# Patient Record
Sex: FEMALE | Race: OTHER | HISPANIC OR LATINO | URBAN - METROPOLITAN AREA
[De-identification: names, ages, dates, MRNs, and addresses within clinical notes are randomized per-mention and may not be internally consistent; named-entity substitution may affect disease eponyms.]

---

## 2019-04-25 ENCOUNTER — OUTPATIENT (OUTPATIENT)
Dept: OUTPATIENT SERVICES | Facility: HOSPITAL | Age: 74
LOS: 1 days | End: 2019-04-25
Payer: COMMERCIAL

## 2019-04-25 DIAGNOSIS — R06.09 OTHER FORMS OF DYSPNEA: ICD-10-CM

## 2019-04-25 PROCEDURE — 93018 CV STRESS TEST I&R ONLY: CPT

## 2019-04-25 PROCEDURE — A9500: CPT

## 2019-04-25 PROCEDURE — 78452 HT MUSCLE IMAGE SPECT MULT: CPT

## 2019-04-25 PROCEDURE — 93016 CV STRESS TEST SUPVJ ONLY: CPT

## 2019-04-25 PROCEDURE — 78452 HT MUSCLE IMAGE SPECT MULT: CPT | Mod: 26

## 2019-04-25 PROCEDURE — 93017 CV STRESS TEST TRACING ONLY: CPT

## 2019-06-03 ENCOUNTER — TRANSCRIPTION ENCOUNTER (OUTPATIENT)
Age: 74
End: 2019-06-03

## 2019-06-03 ENCOUNTER — OUTPATIENT (OUTPATIENT)
Dept: OUTPATIENT SERVICES | Facility: HOSPITAL | Age: 74
LOS: 1 days | End: 2019-06-03
Payer: COMMERCIAL

## 2019-06-03 VITALS
DIASTOLIC BLOOD PRESSURE: 83 MMHG | SYSTOLIC BLOOD PRESSURE: 138 MMHG | RESPIRATION RATE: 16 BRPM | HEART RATE: 67 BPM | OXYGEN SATURATION: 99 %

## 2019-06-03 VITALS
WEIGHT: 179.9 LBS | RESPIRATION RATE: 16 BRPM | OXYGEN SATURATION: 98 % | DIASTOLIC BLOOD PRESSURE: 87 MMHG | HEIGHT: 62 IN | HEART RATE: 71 BPM | SYSTOLIC BLOOD PRESSURE: 164 MMHG | TEMPERATURE: 98 F

## 2019-06-03 DIAGNOSIS — R93.1 ABNORMAL FINDINGS ON DIAGNOSTIC IMAGING OF HEART AND CORONARY CIRCULATION: ICD-10-CM

## 2019-06-03 DIAGNOSIS — Z98.890 OTHER SPECIFIED POSTPROCEDURAL STATES: ICD-10-CM

## 2019-06-03 LAB
ANION GAP SERPL CALC-SCNC: 12 MMOL/L — SIGNIFICANT CHANGE UP (ref 5–17)
APTT BLD: 22.3 SEC — LOW (ref 27.5–36.3)
BUN SERPL-MCNC: 16 MG/DL — SIGNIFICANT CHANGE UP (ref 8–20)
CALCIUM SERPL-MCNC: 9.9 MG/DL — SIGNIFICANT CHANGE UP (ref 8.6–10.2)
CHLORIDE SERPL-SCNC: 100 MMOL/L — SIGNIFICANT CHANGE UP (ref 98–107)
CO2 SERPL-SCNC: 23 MMOL/L — SIGNIFICANT CHANGE UP (ref 22–29)
CREAT SERPL-MCNC: 0.58 MG/DL — SIGNIFICANT CHANGE UP (ref 0.5–1.3)
GLUCOSE SERPL-MCNC: 101 MG/DL — SIGNIFICANT CHANGE UP (ref 70–115)
HCT VFR BLD CALC: 37.9 % — SIGNIFICANT CHANGE UP (ref 37–47)
HGB BLD-MCNC: 12.5 G/DL — SIGNIFICANT CHANGE UP (ref 12–16)
INR BLD: 0.9 RATIO — SIGNIFICANT CHANGE UP (ref 0.88–1.16)
MCHC RBC-ENTMCNC: 29.1 PG — SIGNIFICANT CHANGE UP (ref 27–31)
MCHC RBC-ENTMCNC: 33 G/DL — SIGNIFICANT CHANGE UP (ref 32–36)
MCV RBC AUTO: 88.1 FL — SIGNIFICANT CHANGE UP (ref 81–99)
PLATELET # BLD AUTO: 198 K/UL — SIGNIFICANT CHANGE UP (ref 150–400)
POTASSIUM SERPL-MCNC: 4.6 MMOL/L — SIGNIFICANT CHANGE UP (ref 3.5–5.3)
POTASSIUM SERPL-SCNC: 4.6 MMOL/L — SIGNIFICANT CHANGE UP (ref 3.5–5.3)
PROTHROM AB SERPL-ACNC: 10.3 SEC — SIGNIFICANT CHANGE UP (ref 10–12.9)
RBC # BLD: 4.3 M/UL — LOW (ref 4.4–5.2)
RBC # FLD: 14.3 % — SIGNIFICANT CHANGE UP (ref 11–15.6)
SODIUM SERPL-SCNC: 135 MMOL/L — SIGNIFICANT CHANGE UP (ref 135–145)
WBC # BLD: 6.5 K/UL — SIGNIFICANT CHANGE UP (ref 4.8–10.8)
WBC # FLD AUTO: 6.5 K/UL — SIGNIFICANT CHANGE UP (ref 4.8–10.8)

## 2019-06-03 PROCEDURE — 80048 BASIC METABOLIC PNL TOTAL CA: CPT

## 2019-06-03 PROCEDURE — 93458 L HRT ARTERY/VENTRICLE ANGIO: CPT

## 2019-06-03 PROCEDURE — T1013: CPT

## 2019-06-03 PROCEDURE — 85027 COMPLETE CBC AUTOMATED: CPT

## 2019-06-03 PROCEDURE — 93005 ELECTROCARDIOGRAM TRACING: CPT

## 2019-06-03 PROCEDURE — 36415 COLL VENOUS BLD VENIPUNCTURE: CPT

## 2019-06-03 PROCEDURE — C1894: CPT

## 2019-06-03 PROCEDURE — 99152 MOD SED SAME PHYS/QHP 5/>YRS: CPT

## 2019-06-03 PROCEDURE — 85730 THROMBOPLASTIN TIME PARTIAL: CPT

## 2019-06-03 PROCEDURE — C1769: CPT

## 2019-06-03 PROCEDURE — C1887: CPT

## 2019-06-03 PROCEDURE — 85610 PROTHROMBIN TIME: CPT

## 2019-06-03 PROCEDURE — 93458 L HRT ARTERY/VENTRICLE ANGIO: CPT | Mod: 26

## 2019-06-03 PROCEDURE — 93010 ELECTROCARDIOGRAM REPORT: CPT

## 2019-06-03 RX ORDER — ASPIRIN/CALCIUM CARB/MAGNESIUM 324 MG
81 TABLET ORAL DAILY
Refills: 0 | Status: DISCONTINUED | OUTPATIENT
Start: 2019-06-03 | End: 2019-06-18

## 2019-06-03 RX ADMIN — Medication 81 MILLIGRAM(S): at 13:59

## 2019-06-03 NOTE — DISCHARGE NOTE NURSING/CASE MANAGEMENT/SOCIAL WORK - NSDCDPATPORTLINK_GEN_ALL_CORE
You can access the [facility] Patient Portal, offered by [facility], by registering with the following website: [email or web address]

## 2019-06-03 NOTE — H&P PST ADULT - HISTORY OF PRESENT ILLNESS
72 yo female with pmhx DM, HTN who presents for [facility]. She has c/o DEAN especially when climbing stairs. Currently denies chest pain, sob, palps.     Echo: 4/16/19: EF 55-60%. Evidence of LV dysfunction. Cannot exclude regional wall motion abnormalities due to limited endocardial visualization. Mild MR. Trace TR. 72 yo female with pmhx DM, HTN who presents for C. She has c/o DEAN especially when climbing stairs. Currently denies chest pain, sob, palps. She was found to have an abnormal stress test and referred for LHC.     Echo: 4/16/19: EF 55-60%. Evidence of LV dysfunction. Cannot exclude regional wall motion abnormalities due to limited endocardial visualization. Mild MR. Trace TR.     NST: 4/2019: There is a medium sized mild to moderate defect in anterior and anteroseptal walls that are partially reveresible, suggestive of mild ischemia. Perfusion defects are in part the result of breast attentuation. 72 yo female with pmhx DM, HTN who presents for C. She has c/o DEAN especially when climbing stairs. Currently denies chest pain, sob, palps. She was found to have an abnormal stress test and referred for LHC.     Echo: 4/16/19: EF 55-60%. Evidence of LV dysfunction. Cannot exclude regional wall motion abnormalities due to limited endocardial visualization. Mild MR. Trace TR.     NST: 4/2019: There is a medium sized mild to moderate defect in anterior and anteroseptal walls that are partially reveresible, suggestive of mild ischemia. Perfusion defects are in part the result of breast attentuation.     BRA: 1.7%

## 2019-06-03 NOTE — DISCHARGE NOTE PROVIDER - INSTRUCTIONS
Choose lean meats and poultry without skin and prepare them without added saturated and trans fat.  Eat fish at least twice a week. Recent research shows that eating oily fish containing omega-3 fatty acids (for example, salmon, trout and herring) may help lower your risk of death from coronary artery disease.  Select fat-free, 1 percent fat and low-fat dairy products.  Cut back on foods containing partially hydrogenated vegetable oils to reduce trans fat in your diet.   To lower cholesterol, reduce saturated fat to no more than 5 to 6 percent of total calories. For someone eating 2,000 calories a day, that’s about 13 grams of saturated fat.  Cut back on beverages and foods with added sugars.  Choose and prepare foods with little or no salt. To lower blood pressure, aim to eat no more than 2,400 milligrams of sodium per day. Reducing daily intake to 1,500 mg is desirable because it can lower blood pressure even further.

## 2019-06-03 NOTE — PROGRESS NOTE ADULT - PROBLEM SELECTOR PLAN 1
s/p LHC w/ clean coronary arteries  continue current medication regimen  band off at 15:30  resume diet  ambulate as tolerated  Follow up outpatient with Don HOOKER  disposition home

## 2019-06-03 NOTE — PROGRESS NOTE ADULT - ASSESSMENT
74 yo female with pmhx DM, HTN who presents for [facility]. She has c/o DEAN especially when climbing stairs. Currently denies chest pain, sob, palps. She was found to have an abnormal stress test and referred for C. Echo: 4/16/19: EF 55-60%. Evidence of LV dysfunction. Cannot exclude regional wall motion abnormalities due to limited endocardial visualization. Mild MR. Trace TR.  NST: 4/2019: There is a medium sized mild to moderate defect in anterior and anteroseptal walls that are partially reveresible, suggestive of mild ischemia. Perfusion defects are in part the result of breast attenuation Patient is now s/p LHC with clean coronary arteries.

## 2019-06-03 NOTE — ASU PATIENT PROFILE, ADULT - TEACHING/LEARNING LEARNING PREFERENCES
written material/computer/internet/pictorial/skill demonstration/verbal instruction/audio/group instruction/individual instruction/video

## 2019-06-03 NOTE — PROGRESS NOTE ADULT - SUBJECTIVE AND OBJECTIVE BOX
CC:  Patient is a 73y old  Female who presents with a chief complaint of DEAN (03 Jun 2019 13:15)    HPI:  72 yo female with pmhx DM, HTN who presents for C. She has c/o DEAN especially when climbing stairs. Currently denies chest pain, sob, palps. She was found to have an abnormal stress test and referred for LHC.   Echo: 4/16/19: EF 55-60%. Evidence of LV dysfunction. Cannot exclude regional wall motion abnormalities due to limited endocardial visualization. Mild MR. Trace TR.   NST: 4/2019: There is a medium sized mild to moderate defect in anterior and anteroseptal walls that are partially reveresible, suggestive of mild ischemia. Perfusion defects are in part the result of breast attentuation.   BRA: 1.7% (03 Jun 2019 13:15)    ROS: All review of systems negative unless indicated otherwise below.     Lab Results:  CBC  CBC Full  -  ( 03 Jun 2019 13:29 )  WBC Count : 6.5 K/uL  RBC Count : 4.30 M/uL  Hemoglobin : 12.5 g/dL  Hematocrit : 37.9 %  Platelet Count - Automated : 198 K/uL  Mean Cell Volume : 88.1 fl  Mean Cell Hemoglobin : 29.1 pg  Mean Cell Hemoglobin Concentration : 33.0 g/dL  Auto Neutrophil # : x  Auto Lymphocyte # : x  Auto Monocyte # : x  Auto Eosinophil # : x  Auto Basophil # : x  Auto Neutrophil % : x  Auto Lymphocyte % : x  Auto Monocyte % : x  Auto Eosinophil % : x  Auto Basophil % : x    .		Differential:	[] Automated		[] Manual  Chemistry                        12.5   6.5   )-----------( 198      ( 03 Jun 2019 13:29 )             37.9     06-03    135  |  100  |  16.0  ----------------------------<  101  4.6   |  23.0  |  0.58    Ca    9.9      03 Jun 2019 13:29    PT/INR - ( 03 Jun 2019 13:29 )   PT: 10.3 sec;   INR: 0.90 ratio    PTT - ( 03 Jun 2019 13:29 )  PTT:22.3 sec    MEDICATIONS  (STANDING):  aspirin enteric coated 81 milliGRAM(s) Oral daily    PHYSICAL EXAM:  Vital Signs Last 24 Hrs  T(C): 36.7 (03 Jun 2019 13:40), Max: 36.7 (03 Jun 2019 13:40)  T(F): 98.1 (03 Jun 2019 13:40), Max: 98.1 (03 Jun 2019 13:40)  HR: 72 (03 Jun 2019 15:45) (71 - 74)  BP: 156/80 (03 Jun 2019 15:45) (156/80 - 176/82)  BP(mean): --  RR: 16 (03 Jun 2019 15:45) (16 - 16)  SpO2: 96% (03 Jun 2019 15:45) (96% - 98%)    GENERAL: NAD, well-groomed, well-developed  HEAD:  Atraumatic, Normocephalic  NECK: Supple, No JVD  NERVOUS SYSTEM:  Alert & Oriented X3, Good concentration; Motor Strength 5/5 B/L upper and lower extremities, sensation intact  CHEST/LUNG: Clear to auscultation bilaterally, No rales, rhonchi, wheezing, or rubs  HEART: Regular rate and rhythm; s1 and s2 auscultated, No murmurs, rubs, or gallops  ABDOMEN: Soft, Nontender, Nondistended; Bowel sounds present and normoactive.   EXTREMITIES:  2+ Peripheral Pulses, No clubbing, cyanosis, or edema  SKIN: No rashes or lesions  CATH SITE: Right wrist benign s/p cath.  No bleeding, no ecchymosis, no hematoma. Extremity Warm to touch, with palpable distal pulses, and brisk capillary refill.

## 2019-06-03 NOTE — H&P PST ADULT - NSICDXPASTMEDICALHX_GEN_ALL_CORE_FT
PAST MEDICAL HISTORY:  DM (diabetes mellitus)     HTN (hypertension) PAST MEDICAL HISTORY:  DM (diabetes mellitus)     HTN (hypertension)     Hypothyroid

## 2019-06-03 NOTE — DISCHARGE NOTE PROVIDER - HOSPITAL COURSE
72 yo female with pmhx DM, HTN who presents for [facility]. She has c/o DEAN especially when climbing stairs. Currently denies chest pain, sob, palps. She was found to have an abnormal stress test and referred for [facility]. Echo: 4/16/19: EF 55-60%. Evidence of LV dysfunction. Cannot exclude regional wall motion abnormalities due to limited endocardial visualization. Mild MR. Trace TR.  NST: 4/2019: There is a medium sized mild to moderate defect in anterior and anteroseptal walls that are partially reveresible, suggestive of mild ischemia. Perfusion defects are in part the result of breast attenuation Patient is now s/p C with clean coronary arteries.         Pt with stable vital signs, telemetry stable, physical exam unremarkable and unchanged from pre-procedural baseline, neurovascularly intact, ambulating, eating, review of systems completely negative. pt verbalized an understanding of the discharge teaching. Patient to follow up with Dr. Ochoa within 2 weeks

## 2019-06-03 NOTE — H&P PST ADULT - PULMONARY EMBOLUS
Pt reports to ED via traige. A/Ox3 and ambulatory from waiting room. States she woke up at 11AM this morning with severe pain in right shoulder.  Denies PMH no

## 2019-06-03 NOTE — DISCHARGE NOTE PROVIDER - NSDCCPCAREPLAN_GEN_ALL_CORE_FT
PRINCIPAL DISCHARGE DIAGNOSIS  Diagnosis: S/P coronary angiogram  Assessment and Plan of Treatment: S/p Left heart cath - clearn coronary arteries.

## 2019-06-03 NOTE — DISCHARGE NOTE PROVIDER - CARE PROVIDER_API CALL
Russ Ochoa)  Cardiovascular Disease; Interventional Cardiology  [address] [facility], [address] [place] [address]  Phone: [phone]  Fax: [phone]  Follow Up Time: 2 weeks

## 2020-10-28 PROBLEM — E03.9 HYPOTHYROIDISM, UNSPECIFIED: Chronic | Status: ACTIVE | Noted: 2019-06-03

## 2020-11-05 ENCOUNTER — APPOINTMENT (OUTPATIENT)
Dept: FAMILY MEDICINE | Facility: CLINIC | Age: 75
End: 2020-11-05
Payer: MEDICARE

## 2020-11-05 VITALS
TEMPERATURE: 98.1 F | HEIGHT: 60 IN | OXYGEN SATURATION: 98 % | DIASTOLIC BLOOD PRESSURE: 70 MMHG | SYSTOLIC BLOOD PRESSURE: 105 MMHG | HEART RATE: 82 BPM | WEIGHT: 180 LBS | RESPIRATION RATE: 12 BRPM | BODY MASS INDEX: 35.34 KG/M2

## 2020-11-05 DIAGNOSIS — I10 ESSENTIAL (PRIMARY) HYPERTENSION: ICD-10-CM

## 2020-11-05 DIAGNOSIS — Z23 ENCOUNTER FOR IMMUNIZATION: ICD-10-CM

## 2020-11-05 DIAGNOSIS — Z87.898 PERSONAL HISTORY OF OTHER SPECIFIED CONDITIONS: ICD-10-CM

## 2020-11-05 DIAGNOSIS — M89.8X9 OTHER SPECIFIED DISORDERS OF BONE, UNSPECIFIED SITE: ICD-10-CM

## 2020-11-05 DIAGNOSIS — Z78.9 OTHER SPECIFIED HEALTH STATUS: ICD-10-CM

## 2020-11-05 DIAGNOSIS — E03.9 HYPOTHYROIDISM, UNSPECIFIED: ICD-10-CM

## 2020-11-05 DIAGNOSIS — E78.5 HYPERLIPIDEMIA, UNSPECIFIED: ICD-10-CM

## 2020-11-05 DIAGNOSIS — Z00.00 ENCOUNTER FOR GENERAL ADULT MEDICAL EXAMINATION W/OUT ABNORMAL FINDINGS: ICD-10-CM

## 2020-11-05 DIAGNOSIS — E11.9 TYPE 2 DIABETES MELLITUS W/OUT COMPLICATIONS: ICD-10-CM

## 2020-11-05 PROCEDURE — G0439: CPT

## 2020-11-05 PROCEDURE — 99072 ADDL SUPL MATRL&STAF TM PHE: CPT

## 2020-11-05 PROCEDURE — 90670 PCV13 VACCINE IM: CPT

## 2020-11-05 PROCEDURE — 36415 COLL VENOUS BLD VENIPUNCTURE: CPT

## 2020-11-05 PROCEDURE — G0009: CPT

## 2020-11-05 RX ORDER — MULTIVIT-MIN/IRON/FOLIC ACID/K 18-600-40
CAPSULE ORAL
Refills: 0 | Status: ACTIVE | COMMUNITY

## 2020-11-05 RX ORDER — LORATADINE 10 MG
17 TABLET,DISINTEGRATING ORAL
Refills: 0 | Status: ACTIVE | COMMUNITY

## 2020-11-05 RX ORDER — ISOPROPYL ALCOHOL 700 MG/ML
LIQUID TOPICAL
Refills: 0 | Status: ACTIVE | COMMUNITY

## 2020-11-05 RX ORDER — ASPIRIN 81 MG/1
81 TABLET, DELAYED RELEASE ORAL
Refills: 0 | Status: ACTIVE | COMMUNITY

## 2020-11-05 RX ORDER — BLOOD SUGAR DIAGNOSTIC
STRIP MISCELLANEOUS
Qty: 1 | Refills: 2 | Status: ACTIVE | COMMUNITY
Start: 2020-11-05 | End: 1900-01-01

## 2020-11-05 RX ORDER — ALCOHOL ANTISEPTIC PADS
PADS, MEDICATED (EA) TOPICAL
Refills: 0 | Status: ACTIVE | COMMUNITY
Start: 2020-11-05

## 2020-11-05 RX ORDER — COLD-HOT PACK
EACH MISCELLANEOUS
Refills: 0 | Status: ACTIVE | COMMUNITY

## 2020-11-05 NOTE — PHYSICAL EXAM
[No Acute Distress] : no acute distress [Well Nourished] : well nourished [Well Developed] : well developed [Normal Sclera/Conjunctiva] : normal sclera/conjunctiva [PERRL] : pupils equal round and reactive to light [No Respiratory Distress] : no respiratory distress  [No Accessory Muscle Use] : no accessory muscle use [Clear to Auscultation] : lungs were clear to auscultation bilaterally [Declined Breast Exam] : declined breast exam  [Normal Posterior Cervical Nodes] : no posterior cervical lymphadenopathy [Normal Anterior Cervical Nodes] : no anterior cervical lymphadenopathy [Normal] : no joint swelling and grossly normal strength and tone [No Rash] : no rash [Coordination Grossly Intact] : coordination grossly intact [Normal Gait] : normal gait [Normal Affect] : the affect was normal [Normal Insight/Judgement] : insight and judgment were intact [Comprehensive Foot Exam Normal] : Right and left foot were examined and both feet are normal. No ulcers in either foot. Toes are normal and with full ROM.  Normal tactile sensation with monofilament testing throughout both feet [de-identified] : hard bony mass noted left side clavicular notch- patient reports having this for many years, never had it checked; will check XR. [FreeTextEntry1] : ifobt

## 2020-11-05 NOTE — HEALTH RISK ASSESSMENT
[Good] : ~his/her~  mood as  good [No] : In the past 12 months have you used drugs other than those required for medical reasons? No [No falls in past year] : Patient reported no falls in the past year [0] : 2) Feeling down, depressed, or hopeless: Not at all (0) [No Retinopathy] : No retinopathy [Patient reported PAP Smear was normal] : Patient reported PAP Smear was normal [Patient declined bone density test] : Patient declined bone density test [Patient reported colonoscopy was normal] : Patient reported colonoscopy was normal [HIV test declined] : HIV test declined [Hepatitis C test declined] : Hepatitis C test declined [None] : None [Cultural] : cultural [Alone] : lives alone [Retired] : retired [Single] : single [Feels Safe at Home] : Feels safe at home [Fully functional (bathing, dressing, toileting, transferring, walking, feeding)] : Fully functional (bathing, dressing, toileting, transferring, walking, feeding) [Fully functional (using the telephone, shopping, preparing meals, housekeeping, doing laundry, using] : Fully functional and needs no help or supervision to perform IADLs (using the telephone, shopping, preparing meals, housekeeping, doing laundry, using transportation, managing medications and managing finances) [Smoke Detector] : smoke detector [Carbon Monoxide Detector] : carbon monoxide detector [Seat Belt] :  uses seat belt [Sunscreen] : uses sunscreen [Patient/Caregiver not ready to engage] : Patient/Caregiver not ready to engage [] : No [de-identified] : minimal  [de-identified] : well balanced [[id number]] : 0 [EyeExamDate] : 2019 [Change in mental status noted] : No change in mental status noted [Language] : denies difficulty with language [Sexually Active] : not sexually active [Reports changes in hearing] : Reports no changes in hearing [Reports changes in vision] : Reports no changes in vision [Reports normal functional visual acuity (ie: able to read med bottle)] : Reports poor functional visual acuity.  [Reports changes in dental health] : Reports no changes in dental health [Guns at Home] : no guns at home [Safety elements used in home] : no safety elements used in home [Travel to Developing Areas] : does not  travel to developing areas [TB Exposure] : is not being exposed to tuberculosis [Caregiver Concerns] : does not have caregiver concerns [MammogramDate] : ordered [PapSmearDate] : 2018 [BoneDensityDate] : ordered [ColonoscopyDate] : 2016 [de-identified] : lenses

## 2020-11-05 NOTE — PLAN
[FreeTextEntry1] : no need to check labs today; patient to f/u in 4- 6 weeks for thyroid values check, sooner if needed.\par f/u with cardiology, neurology, endocrinology as discussed.\par f/u with podiatry and ophthalmology as discussed.\par complete mammo and DEXA scan as ordered- will f/u. \par complete XR left clavicle notch- f/u result. \par patient to have all reports sent to our office for review and record keeping.\par medications renewed as requested.\par pneumococcal vaccine administered today, counseled on vaccine, tolerated well. \par notify office if worsening/persistent symptoms or new onset complaints/concerns, in the event of any emergency or life threatening condition, go to the nearest emergency department and then notify office. \par patient verbalized understanding and agrees with plan of care.

## 2020-11-05 NOTE — COUNSELING
[Behavioral health counseling provided] : Behavioral health counseling provided [Sleep ___ hours/day] : Sleep [unfilled] hours/day [Engage in a relaxing activity] : Engage in a relaxing activity [Plan in advance] : Plan in advance [None] : None [Good understanding] : Patient has a good understanding of lifestyle changes and steps needed to achieve self management goal [de-identified] : Maintain balanced diet of whole grain, fruits and vegetables, lean meats, skinless poultry, fish, beans, soy products, eggs, nuts, and low fat dairy as per FDA dietary guidelines. \par Avoid high calorie/low nutrient foods. \par vegetables/fruits- at least 5 servings/day, \par whole grains- 100% whole grain and at least 3 grams fiber/day.\par reduce/eliminate saturated fat- less than 5% on nutrition labels (ex. adams, deli meat, hot dogs, fried foods, cookies, ice cream, chips, soft drinks, etc.)\par stay within your FDA recommended daily calorie needs or use the plate method to portion intake. \par Avoid fast food and limit eating out. When eating out choose healthy alternatives (ex. grilled, baked, steamed. low fat dressings. avoid french fries).\par DO NOT CONSUME FOODS YOU ARE ALLERGIC TO DESPITE DIETARY RECOMMENDATIONS.\par \par For more information you can visit www.Health.gov \par \par Incorporate regular physical activity most days of the week. \par Regular aerobic activity- moderate intensity, most days/wk, at least 30min/day- ex. brisk walk 2.5miles/hr, ballroom dancing, water aerobics, light yard work (raking/lawn mowing) actively playing basketball, biking 10miles/hr.\par Muscle strengthening and strength/resistance exercises 2-3days/wk- ex. free weights, resistance bands, your own weight (squats/pull-ups/push-ups).\par Neuro-motor exercises for balance/agility/coordination and flexibility exercises 2-3days/wk, 20-30min/day- ex. yoga and kelli-chi.\par Bone strengthening at least 30min/day, most days of the week- ex. weights, resistance bands, running, brisk walking, jumping rope, stair climbing, tennis.\par Decrease sedentary time. \par LISTEN TO YOUR BODY AND MODIFY ACTIVITY AS NEEDED- DO NOT OVEREXERT YOURSELF DURING PHYSICAL ACTIVITY DESPITE RECOMMENDATION.\par \par For more information you can visit www.Health.gov \par \par Foot care: check feet daily and wash with mild soap and lukewarm water. \par Use lotions (as directed by podiatrist).\par File/clip toe nails after washing (as directed by podiatrist).\par Do not tear calluses. \par Use clean socks with well-fitted shoes.\par Do not go bare foot and do not cross legs. \par you should follow-up with a podiatrist yearly for maintenance of your feet.\par \par Eye care- you should follow-up with an ophthalmologist/optometrist yearly to screen for retinal damage and other complications that may occur secondary to diabetes. \par Bring the paperwork provided to you today to your visit to have the doctor complete and have the paperwork returned to our office.\par \par For more information you can visit www.medlineplus.gov  \par

## 2020-11-05 NOTE — HISTORY OF PRESENT ILLNESS
[[employer] ] : provided by [employer]   [FreeTextEntry1] : [id number] [FreeTextEntry2] : Nedra [TWNoteComboBox1] : Canadian [de-identified] : This is a 76y/o Nicaraguan speaking female here today to establish care with new PCP, previous provider was not able to have  and patient is switching office because of this.\par PMH includes HTN, HLD, CAD with h/o stent placement; patient is poor historian, reports as well managed on Losartan, Atorvastatin, and low dose daily asa as directed, NP obtaining reports from cardiologist for review of cardiac history. spoke with patient's cardiologist Dr. Bond as per office personnel, patient has not been seen since Feb. 2020 and requires f/u. agrees to contact patient to schedule f/u visit and agrees to fax patient's reports. \par PMH also includes hypothyroid; previously on levothyroxine 115mcg daily, NP reviewed patients most recent labs from Oct. 2020- TSH elevated. patient denies having followed up with PCP since labs were checked- will adjust medication as appropriate, patient agrees to RTO for recheck of thyroid in 4- 6 weeks. \par DM2; patient reports as well managed on Janumet, Januvia, and Glipizide taken as prescribed, hgba1c reviewed from lab work 10/2020- value within goal range, will continue medications and referred to endocrinology as patient is on 3 agents. \par patient endorses h/o memory loss, denies having ever followed up with neurology- will provide referral for further evaluation.\par PSH; as discussed above.\par family history; poor historian. \par o/w no other major concerns; denies other major accidents, injuries, illnesses, hospitalizations.

## 2020-11-05 NOTE — REVIEW OF SYSTEMS
[Memory Loss] : memory loss [Negative] : Heme/Lymph [Headache] : no headache [Dizziness] : no dizziness [Fainting] : no fainting [Confusion] : no confusion [Unsteady Walking] : no ataxia [FreeTextEntry7] : constipation at times- reports as well managed with OTC MiraLAX as directed. [de-identified] : as per patient. referral to neurology for further evaluation.

## 2020-11-16 ENCOUNTER — NON-APPOINTMENT (OUTPATIENT)
Age: 75
End: 2020-11-16

## 2020-11-16 LAB — HEMOCCULT STL QL IA: NEGATIVE

## 2020-11-17 RX ORDER — GLIPIZIDE 10 MG/1
10 TABLET, EXTENDED RELEASE ORAL DAILY
Qty: 30 | Refills: 0 | Status: ACTIVE | COMMUNITY
Start: 2020-11-05 | End: 1900-01-01

## 2020-11-17 RX ORDER — LOSARTAN POTASSIUM 50 MG/1
50 TABLET, FILM COATED ORAL DAILY
Qty: 90 | Refills: 0 | Status: ACTIVE | COMMUNITY
Start: 2020-11-05 | End: 1900-01-01

## 2020-11-17 RX ORDER — SITAGLIPTIN AND METFORMIN HYDROCHLORIDE 50; 1000 MG/1; MG/1
50-1000 TABLET, FILM COATED ORAL TWICE DAILY
Qty: 60 | Refills: 0 | Status: ACTIVE | COMMUNITY
Start: 2020-11-05 | End: 1900-01-01

## 2020-12-17 ENCOUNTER — RX RENEWAL (OUTPATIENT)
Age: 75
End: 2020-12-17

## 2021-01-03 ENCOUNTER — RX RENEWAL (OUTPATIENT)
Age: 76
End: 2021-01-03

## 2021-01-06 ENCOUNTER — RX RENEWAL (OUTPATIENT)
Age: 76
End: 2021-01-06

## 2021-01-14 ENCOUNTER — RX RENEWAL (OUTPATIENT)
Age: 76
End: 2021-01-14

## 2021-01-14 RX ORDER — ATORVASTATIN CALCIUM 20 MG/1
20 TABLET, FILM COATED ORAL
Qty: 30 | Refills: 0 | Status: ACTIVE | COMMUNITY
Start: 2020-11-05 | End: 1900-01-01

## 2021-02-09 ENCOUNTER — APPOINTMENT (OUTPATIENT)
Dept: CT IMAGING | Facility: CLINIC | Age: 76
End: 2021-02-09

## 2021-02-09 ENCOUNTER — RX RENEWAL (OUTPATIENT)
Age: 76
End: 2021-02-09

## 2021-03-31 ENCOUNTER — APPOINTMENT (OUTPATIENT)
Dept: ENDOCRINOLOGY | Facility: CLINIC | Age: 76
End: 2021-03-31

## 2023-03-07 ENCOUNTER — APPOINTMENT (OUTPATIENT)
Dept: OTOLARYNGOLOGY | Facility: CLINIC | Age: 78
End: 2023-03-07

## 2023-03-24 ENCOUNTER — APPOINTMENT (OUTPATIENT)
Dept: OTOLARYNGOLOGY | Facility: CLINIC | Age: 78
End: 2023-03-24
Payer: MEDICARE

## 2023-04-14 ENCOUNTER — APPOINTMENT (OUTPATIENT)
Dept: OTOLARYNGOLOGY | Facility: CLINIC | Age: 78
End: 2023-04-14
Payer: MEDICARE

## 2023-04-14 PROCEDURE — 99204 OFFICE O/P NEW MOD 45 MIN: CPT

## 2023-04-14 RX ORDER — LEVOTHYROXINE SODIUM 0.12 MG/1
125 TABLET ORAL
Qty: 30 | Refills: 0 | Status: COMPLETED | COMMUNITY
Start: 2020-11-05 | End: 2023-04-14

## 2023-04-14 RX ORDER — LEVOTHYROXINE SODIUM 0.11 MG/1
112 TABLET ORAL
Refills: 0 | Status: ACTIVE | COMMUNITY

## 2023-04-14 NOTE — HISTORY OF PRESENT ILLNESS
[de-identified] : 77 yro pt referred by Dee Rocha NP for eval of thyroid.  labs 2/10/2023: TSH 0.67 wnl and Free T4 1.31 wnl.  US done in Dr. Rocha's office. Pt denies dysphonia, dysphagia, pain, SOB, otalgia.\par Family hx of thyroid cancer - niece.   Pt denies hx of radiation to the neck.  Pt takes levothyroxine 112mcg.

## 2023-04-14 NOTE — REASON FOR VISIT
[[employer] ] : provided by [employer]   [FreeTextEntry2] : thyroid  [Interpreters_IDNumber] : [id number] [Interpreters_FullName] : Gabino [TWNoteComboBox1] : Uzbek

## 2023-04-21 ENCOUNTER — OFFICE (OUTPATIENT)
Dept: URBAN - METROPOLITAN AREA CLINIC 115 | Facility: CLINIC | Age: 78
Setting detail: OPHTHALMOLOGY
End: 2023-04-21
Payer: MEDICAID

## 2023-04-21 DIAGNOSIS — B30.9: ICD-10-CM

## 2023-04-21 PROCEDURE — 92012 INTRM OPH EXAM EST PATIENT: CPT | Performed by: OPTOMETRIST

## 2023-04-21 ASSESSMENT — PACHYMETRY
OS_CT_UM: 593
OD_CT_UM: 605
OD_CT_CORRECTION: -4
OS_CT_CORRECTION: -4

## 2023-04-21 ASSESSMENT — REFRACTION_MANIFEST
OD_SPHERE: PLANO
OD_ADD: +2.50
OS_CYLINDER: -1.50
OD_SPHERE: PLANO
OD_VA1: 20/25+2
OS_ADD: +2.50
OS_CYLINDER: -1.50
OD_CYLINDER: -2.00
OS_VA1: 20/25-1
OD_CYLINDER: -2.00
OS_AXIS: 065
OD_AXIS: 065
OS_ADD: +2.50
OD_VA1: 20/25+2
OS_AXIS: 065
OS_VA1: 20/25-1
OU_VA: 20/25-2
OS_SPHERE: +0.25
OD_ADD: +2.50
OU_VA: 20/25-2
OS_SPHERE: +0.25
OD_AXIS: 065

## 2023-04-21 ASSESSMENT — KERATOMETRY
OS_K1POWER_DIOPTERS: 41.25
OD_K1POWER_DIOPTERS: 36.00
METHOD_AUTO_MANUAL: AUTO
OD_K2POWER_DIOPTERS: 43.00
OS_AXISANGLE_DEGREES: 170
OD_AXISANGLE_DEGREES: 180
OS_K2POWER_DIOPTERS: 43.25

## 2023-04-21 ASSESSMENT — SPHEQUIV_DERIVED
OS_SPHEQUIV: -0.5
OS_SPHEQUIV: -0.5

## 2023-04-21 ASSESSMENT — SUPERFICIAL PUNCTATE KERATITIS (SPK)
OS_SPK: 1+
OD_SPK: 1+

## 2023-04-21 ASSESSMENT — AXIALLENGTH_DERIVED
OS_AL: 24.2645
OS_AL: 24.2645

## 2023-04-21 ASSESSMENT — REFRACTION_CURRENTRX
OS_OVR_VA: 20/
OD_OVR_VA: 20/

## 2023-04-21 ASSESSMENT — VISUAL ACUITY
OD_BCVA: 20/60
OS_BCVA: 20/50

## 2023-05-05 ENCOUNTER — APPOINTMENT (OUTPATIENT)
Dept: ULTRASOUND IMAGING | Facility: CLINIC | Age: 78
End: 2023-05-05
Payer: MEDICARE

## 2023-05-05 ENCOUNTER — OUTPATIENT (OUTPATIENT)
Dept: OUTPATIENT SERVICES | Facility: HOSPITAL | Age: 78
LOS: 1 days | End: 2023-05-05

## 2023-05-05 DIAGNOSIS — E04.1 NONTOXIC SINGLE THYROID NODULE: ICD-10-CM

## 2023-05-05 PROCEDURE — 76536 US EXAM OF HEAD AND NECK: CPT | Mod: 26

## 2023-05-12 ENCOUNTER — APPOINTMENT (OUTPATIENT)
Dept: OTOLARYNGOLOGY | Facility: CLINIC | Age: 78
End: 2023-05-12
Payer: MEDICARE

## 2023-05-12 VITALS
DIASTOLIC BLOOD PRESSURE: 70 MMHG | BODY MASS INDEX: 38.83 KG/M2 | HEART RATE: 82 BPM | HEIGHT: 57 IN | SYSTOLIC BLOOD PRESSURE: 117 MMHG | WEIGHT: 180 LBS

## 2023-05-12 DIAGNOSIS — K21.9 GASTRO-ESOPHAGEAL REFLUX DISEASE W/OUT ESOPHAGITIS: ICD-10-CM

## 2023-05-12 PROCEDURE — 31575 DIAGNOSTIC LARYNGOSCOPY: CPT

## 2023-05-12 PROCEDURE — 99213 OFFICE O/P EST LOW 20 MIN: CPT | Mod: 25

## 2023-05-12 NOTE — HISTORY OF PRESENT ILLNESS
[de-identified] : 77 year old female presents for follow up for thyroid nodules. Last blood labs 2/10/2023: TSH 0.67 wnl and Free T4 1.31 wnl. States has a lot of phlegm in throat that is stuck inside her throat at times able to cough white to yellow phlegm and has a nagging hard cough causes her to choke,and dysphagia at times when eating. Denies odynophagia, dyspnea, dysphonia, night sweats, chills, fevers, or otalgia. \par \par US Thyroid 5/5/23\par IMPRESSION:\par Diffusely heterogeneous thyroid gland consistent with thyroiditis.\par Subcentimeter left thyroid nodules. There are no suspicious lesions.\par TI-RAD 2: Not suspicious (No FNA)\par \par Complete review of systems which was performed during a previous encounter was reviewed with the patient and there are no changes except as stated in the HPI section.\par

## 2023-05-12 NOTE — REASON FOR VISIT
[Subsequent Evaluation] : a subsequent evaluation for [Other: ______] : provided by LEONARDA [FreeTextEntry2] : thyroid nodules  [Interpreters_IDNumber] : [id number] [Interpreters_FullName] : Willian [TWNoteComboBox1] : Nicaraguan

## 2023-05-30 ENCOUNTER — APPOINTMENT (OUTPATIENT)
Dept: OTOLARYNGOLOGY | Facility: CLINIC | Age: 78
End: 2023-05-30
Payer: MEDICARE

## 2023-05-30 DIAGNOSIS — E04.1 NONTOXIC SINGLE THYROID NODULE: ICD-10-CM

## 2023-05-30 DIAGNOSIS — H90.3 SENSORINEURAL HEARING LOSS, BILATERAL: ICD-10-CM

## 2023-05-30 PROCEDURE — 99213 OFFICE O/P EST LOW 20 MIN: CPT | Mod: 25

## 2023-05-30 PROCEDURE — 92557 COMPREHENSIVE HEARING TEST: CPT

## 2023-05-30 PROCEDURE — 92567 TYMPANOMETRY: CPT

## 2023-05-30 RX ORDER — SITAGLIPTIN 100 MG/1
100 TABLET, FILM COATED ORAL DAILY
Qty: 30 | Refills: 0 | Status: COMPLETED | COMMUNITY
Start: 2020-11-05 | End: 2023-05-30

## 2023-05-30 RX ORDER — CEFPODOXIME PROXETIL 200 MG/1
200 TABLET, FILM COATED ORAL
Qty: 14 | Refills: 0 | Status: DISCONTINUED | COMMUNITY
Start: 2023-05-18

## 2023-05-30 NOTE — PHYSICAL EXAM
[Normal] : mucosa is normal [Midline] : trachea located in midline position [FreeTextEntry1] : overweight, NAD  [de-identified] : no LAD, +thyroid nodule

## 2023-05-30 NOTE — REASON FOR VISIT
[Other: ______] : provided by LEONARDA [FreeTextEntry2] : hearing loss [Interpreters_IDNumber] : [id number] [Interpreters_FullName] : Heather

## 2023-05-30 NOTE — CONSULT LETTER
[Dear  ___] : Dear ~DARREN, [Courtesy Letter:] : I had the pleasure of seeing your patient, [unfilled], in my office today. [Please see my note below.] : Please see my note below. [Sincerely,] : Sincerely, [FreeTextEntry2] : Dee Velásquez NP \par [address]\[place] [address] [FreeTextEntry3] : Yazmin Casas PA-C \par Department of Otolaryngology\par [facility]\par Otolaryngology at [place]\par [address] [facility],\[place] [address]\par \par \par Otf Velez MD, FACS \par Chief of Otolaryngology at [facility] \par  \par Dept. of Otolaryngology \par [facility]

## 2023-05-30 NOTE — HISTORY OF PRESENT ILLNESS
[de-identified] : 77F presents as a referral from Dr. Fitzpatrick for hearing loss. Patient's daughter feels that she has not been able to hear as well lately. She reports a gradual onset, says right ear is better than the left. Denies history of otalgia, otorrhea, ear infections, tinnitus, dizziness, vertigo, ear surgeries, head/otologic trauma, no chemo therapy. Denies loud noise exposure.\par \par Patient also reports throat discomfort, says she has a lot of phlegm. Reports heartburn, cough. Patient denies dysphagia, odynophagia, dyspnea, dysphonia, globus. \par

## 2023-05-31 ENCOUNTER — OFFICE (OUTPATIENT)
Dept: URBAN - METROPOLITAN AREA CLINIC 115 | Facility: CLINIC | Age: 78
Setting detail: OPHTHALMOLOGY
End: 2023-05-31
Payer: MEDICAID

## 2023-05-31 DIAGNOSIS — H35.40: ICD-10-CM

## 2023-05-31 DIAGNOSIS — H25.13: ICD-10-CM

## 2023-05-31 DIAGNOSIS — B30.9: ICD-10-CM

## 2023-05-31 DIAGNOSIS — H16.223: ICD-10-CM

## 2023-05-31 DIAGNOSIS — E11.9: ICD-10-CM

## 2023-05-31 DIAGNOSIS — H35.033: ICD-10-CM

## 2023-05-31 PROCEDURE — 92250 FUNDUS PHOTOGRAPHY W/I&R: CPT | Performed by: OPTOMETRIST

## 2023-05-31 PROCEDURE — 92012 INTRM OPH EXAM EST PATIENT: CPT | Performed by: OPTOMETRIST

## 2023-05-31 ASSESSMENT — PACHYMETRY
OD_CT_CORRECTION: -4
OS_CT_UM: 593
OD_CT_UM: 605
OS_CT_CORRECTION: -4

## 2023-05-31 ASSESSMENT — SUPERFICIAL PUNCTATE KERATITIS (SPK)
OS_SPK: 1+
OD_SPK: 1+

## 2023-05-31 ASSESSMENT — REFRACTION_AUTOREFRACTION
OD_SPHERE: -0.75
OD_CYLINDER: -1.50
OS_AXIS: 064
OS_SPHERE: +0.25
OS_CYLINDER: -1.75
OD_AXIS: 073

## 2023-05-31 ASSESSMENT — REFRACTION_MANIFEST
OU_VA: 20/25-2
OS_SPHERE: +0.25
OS_ADD: +2.50
OS_ADD: +2.50
OD_VA1: 20/25+2
OD_VA1: 20/25+2
OD_SPHERE: PLANO
OD_CYLINDER: -2.00
OS_CYLINDER: -1.50
OS_CYLINDER: -1.50
OD_AXIS: 065
OD_SPHERE: PLANO
OD_ADD: +2.50
OS_AXIS: 065
OD_CYLINDER: -2.00
OS_SPHERE: +0.25
OS_VA1: 20/25-1
OS_VA1: 20/25-1
OS_AXIS: 065
OD_ADD: +2.50
OD_AXIS: 065
OU_VA: 20/25-2

## 2023-05-31 ASSESSMENT — VISUAL ACUITY
OS_BCVA: 20/50
OD_BCVA: 20/70

## 2023-05-31 ASSESSMENT — SPHEQUIV_DERIVED
OS_SPHEQUIV: -0.5
OS_SPHEQUIV: -0.5
OD_SPHEQUIV: -1.5
OS_SPHEQUIV: -0.625

## 2023-05-31 ASSESSMENT — REFRACTION_CURRENTRX
OS_OVR_VA: 20/
OD_OVR_VA: 20/

## 2023-05-31 ASSESSMENT — CONFRONTATIONAL VISUAL FIELD TEST (CVF)
OS_FINDINGS: FULL
OD_FINDINGS: FULL

## 2023-05-31 ASSESSMENT — TONOMETRY: OS_IOP_MMHG: 21

## 2023-11-01 ENCOUNTER — APPOINTMENT (OUTPATIENT)
Dept: PULMONOLOGY | Facility: CLINIC | Age: 78
End: 2023-11-01

## 2023-11-13 ENCOUNTER — APPOINTMENT (OUTPATIENT)
Dept: VASCULAR SURGERY | Facility: CLINIC | Age: 78
End: 2023-11-13
Payer: MEDICARE

## 2023-11-13 VITALS
OXYGEN SATURATION: 95 % | HEIGHT: 60 IN | SYSTOLIC BLOOD PRESSURE: 115 MMHG | HEART RATE: 80 BPM | WEIGHT: 178 LBS | DIASTOLIC BLOOD PRESSURE: 68 MMHG | RESPIRATION RATE: 14 BRPM | BODY MASS INDEX: 34.95 KG/M2 | TEMPERATURE: 98.2 F

## 2023-11-13 DIAGNOSIS — R60.0 LOCALIZED EDEMA: ICD-10-CM

## 2023-11-13 DIAGNOSIS — Z86.79 PERSONAL HISTORY OF OTHER DISEASES OF THE CIRCULATORY SYSTEM: ICD-10-CM

## 2023-11-13 DIAGNOSIS — E11.65 TYPE 2 DIABETES MELLITUS WITH HYPERGLYCEMIA: ICD-10-CM

## 2023-11-13 DIAGNOSIS — Z86.39 PERSONAL HISTORY OF OTHER ENDOCRINE, NUTRITIONAL AND METABOLIC DISEASE: ICD-10-CM

## 2023-11-13 DIAGNOSIS — E11.21 TYPE 2 DIABETES MELLITUS WITH DIABETIC NEPHROPATHY: ICD-10-CM

## 2023-11-13 PROCEDURE — 99203 OFFICE O/P NEW LOW 30 MIN: CPT

## 2023-11-13 RX ORDER — GABAPENTIN 100 MG/1
100 CAPSULE ORAL 3 TIMES DAILY
Qty: 90 | Refills: 0 | Status: ACTIVE | COMMUNITY
Start: 2023-11-13 | End: 1900-01-01

## 2023-11-14 RX ORDER — CHLORHEXIDINE GLUCONATE 213 G/1000ML
1 SOLUTION TOPICAL ONCE
Refills: 0 | Status: DISCONTINUED | OUTPATIENT
Start: 2023-11-15 | End: 2023-11-30

## 2023-11-14 NOTE — H&P PST ADULT - HISTORY OF PRESENT ILLNESS
HPI:     77 y/o female with PMHx of HTN, HLD, poorly controlled DM, and coronary artery calcifications, was seen at OP cardiology office for follow up, Patient with c/o mild exertional dyspnea, and left leg pain.  Patient underwent coronary CTA , revealing possible high grade stenosis of LAD with a significant calcium burden,  patient had cardiac PET  scan in 5/23 which was negative for ischemia. Patient underwent lower extremity PVR, which was inconclusive due to npn compressibility, further vascular evaluation pending.  Patient currently denies Dizziness, CP, Palpitations**********  Patient now presents to [facility] CCL for LHC to assess coronary artery with possible intervention.    Symptoms:        Angina (Class):        Ischemic Symptoms: SOB on exertion    Heart Failure: No       Systolic/Diastolic/Combined:        NYHA Class (within 2 weeks):     Assessment of LVEF (Must be within 6 months):       EF: 58%       Assessed by: TTE       Date: 02/2023    Prior Cardiac Interventions:       PCI's (Date, Stents, Vessels): None       CABG (Date, Grafts): no    Noninvasive Testing:   REST/ Stress/PET/CT Test: Date: 05/08/23       Protocol: regadenososon         EKG Changes: negative       Myocardial Imaging: negative stress EKG, coronary calcification is noted involving LAD, RCA        No evidence for significant ischemia or scar is noted       During stress: Global systolic function is normal, EF IS 60%,     TTE, Coronary CTA report not in chart/system, requested from DR Velazquez's office*********    Echo (Date, Findings): 02/23   EF: 58%,mild to moderate MR, mild MS, Mild to moderate TR    Coronary CTA with total calcium score of 1516 with possible significant stenosis noted in the LAD     02/23: Carotid doppler: Mild plaque    05/08/23 PET/CT myocardial perfusion scan      Antianginal Therapies:        Beta Blockers:  none       Calcium Channel Blockers: none       Long Acting Nitrates: none       Ranexa: none      Associated Risk Factors:        Cerebrovascular Disease: N/A       Chronic Lung Disease: N/A       Peripheral Arterial Disease: N/A       Chronic Kidney Disease (if yes, what is GFR): N/A       Uncontrolled Diabetes (if yes, what is HgbA1C or FBS): yes A1c not available        Poorly Controlled Hypertension (if yes, what is SBP): N/A       Morbid Obesity (if yes, what is BMI): N/A       History of Recent Ventricular Arrhythmia: N/A       Inability to Ambulate Safely: N/A       Need for Therapeutic Anticoagulation: N/A       Antiplatelet or Contrast Allergy: N/A     HPI:     77 y/o female with PMHx of HTN, HLD, poorly controlled DM, and coronary artery calcifications, was seen at OP cardiology office for follow up, Patient with c/o mild exertional dyspnea, and left leg pain.  Patient underwent coronary CTA , revealing possible high grade stenosis of LAD with a significant calcium burden,  patient had cardiac PET  scan in 5/23 which was negative for ischemia. Patient underwent lower extremity PVR, which was inconclusive due to npn compressibility, further vascular evaluation pending.  Patient currently denies Dizziness, CP, Palpitations**********  Patient now presents to [facility] CCL for LHC to assess coronary artery with possible intervention.    Symptoms:        Angina (Class):        Ischemic Symptoms: SOB on exertion    Heart Failure: No       Systolic/Diastolic/Combined:        NYHA Class (within 2 weeks):     Assessment of LVEF (Must be within 6 months):       EF: 58%       Assessed by: TTE       Date: 02/2023    Prior Cardiac Interventions:       PCI's (Date, Stents, Vessels): None       CABG (Date, Grafts): no    Noninvasive Testing:   REST/ Stress/PET/CT Test: Date: 05/08/23       Protocol: regadenososon         EKG Changes: negative       Myocardial Imaging: negative stress EKG, coronary calcification is noted involving LAD, RCA        No evidence for significant ischemia or scar is noted       During stress: Global systolic function is normal, EF IS 60%,     TTE, Coronary CTA report not in chart/system, requested from DR Velazquez's office*********    Echo (Date, Findings): 02/23   EF: 58%,mild to moderate MR, mild MS, Mild to moderate TR    Coronary CTA with total calcium score of 1516 with possible significant stenosis noted in the LAD     02/23: Carotid doppler: Mild plaque    05/08/23 PET/CT myocardial perfusion scan      Antianginal Therapies:        Beta Blockers:  none       Calcium Channel Blockers: none       Long Acting Nitrates: none       Ranexa: none      Associated Risk Factors:        Cerebrovascular Disease: N/A       Chronic Lung Disease: N/A       Peripheral Arterial Disease: N/A       Chronic Kidney Disease (if yes, what is GFR): N/A       Uncontrolled Diabetes (if yes, what is HgbA1C or FBS): yes A1c not available        Poorly Controlled Hypertension (if yes, what is SBP): N/A       Morbid Obesity (if yes, what is BMI): N/A       History of Recent Ventricular Arrhythmia: N/A       Inability to Ambulate Safely: N/A       Need for Therapeutic Anticoagulation: N/A       Antiplatelet or Contrast Allergy: N/A    Vital Signs Last 24 Hrs  T(C): --  T(F): --  HR: --  BP: --  BP(mean): --  RR: --  SpO2: --         77 y/o female with PMHx of HTN, HLD, poorly controlled DM, and coronary artery calcifications, was seen at OP cardiology office for follow up, Patient with c/o mild exertional dyspnea, and left leg pain.  Patient underwent coronary CTA , revealing possible high grade stenosis of LAD with a significant calcium burden,  patient had cardiac PET  scan in 5/23 which was negative for ischemia. Patient underwent lower extremity PVR, which was inconclusive due to npn compressibility, further vascular evaluation pending.      Symptoms:        Angina (Class):        Ischemic Symptoms:     Heart Failure: N/A    Assessment of LVEF:       EF: 60%       Assessed by: Coronary CTA       Date: 11/3/2023    Prior Cardiac Interventions:       PCI's: N/A       CABG: N/A    LHC: 6/3/2019  CORONARY VESSELS: The coronary circulation is right dominant.  LM:     --  LM: Normal.  LAD:     --  Proximal LAD: There was a 10 % stenosis.  --  D1: There was a 40 % stenosis.  CX:     --  Distal circumflex: There was a 20 % stenosis.  RCA:     --  RCA: Angiography showed mild atherosclerosis with no flow limiting lesions.    Noninvasive Testing:   Cardiac CTA Calcium Score: 11/3/2023       Coronary Circulation: Right dominant        LM: There is a mixture of calcified and noncalcified plaque in the LM with no discrete luminal stenosis > 20%. (49)       LAD: There is a mixture of calcified and noncalcified plaque in the pLAD, mLAD, and D1. There is a suspected luminal stenosis > 70% within 1 cm of the mLAD, approximately 2 cm from it's origin. There is a suspected luminal stenosis > 70% in the proximal 1 cm of the D1. (518)       LCX: There is a mixture of calcified and noncalcified plaque in the pLCX and mLCX with no discrete luminal stenosis > 50%. (33)       RCA: There is a mixture of calcified and noncalcified plaque in the pRCA, mRCA, dRCA, and PLV branch with no discrete luminal stenosis > 50%. (916)       Total: 1516    Stress Test: 5/8/2023       Protocol: Cardiac PET Scan       Symptoms: None       EKG Changes: Negative       Myocardial Imaging: Now evidence of infarct or ischemia. Coronary calcification in the LAD and RCA region.       Risk Assessment: Low    Echo: 4/16/2019  1. Left Ventricle: Normal LV size and systolic function. Estimated LVEF 55-60%. Evidence of diastolic LV dysfunction.  Cannot exclude regional wall motion abnormalities due to limited endocardial visualization.   2. Right Ventricle: Normal RV size and systolic function.   3. Atria: Moderate LA enlargement.   4. Aortic Valve: Sclerotic aortic valves with adequate opening.   5. Mitral Valve: Mild mitral stenosis. Mild mitral regurgitation.   6. Tricuspid Valve: Trace tricuspid regurgitation.   7. Pulmonic Valve: Grossly normal valvular function.   8. Estimated PA systolic pressure 30-35mmHg, normal.   9. Technically limited study.     Antianginal Therapies:        Beta Blockers: N/A       Calcium Channel Blockers: N/A       Long Acting Nitrates: N/A       Ranexa: N/A    Associated Risk Factors:        Frailty: N/A       Cerebrovascular Disease: N/A       Chronic Lung Disease: N/A       Peripheral Arterial Disease: N/A       Chronic Kidney Disease (if yes, what is GFR): N/A       Uncontrolled Diabetes (if yes, what is HgbA1C or FBS): N/A       Poorly Controlled Hypertension (if yes, what is SBP): N/A       Morbid Obesity (if yes, what is BMI): N/A       History of Recent Ventricular Arrhythmia: N/A       Inability to Ambulate Safely: N/A       Need for Therapeutic Anticoagulation: N/A       Antiplatelet or Contrast Allergy: N/A    Social History:        Marital:        Tobacco:        ETOH:        Caffeine:     ROS:   General: No fevers/chills. No fatigue  HEENT: No hearing loss. No visual disturbances. No headaches. No epistaxis.  Pulmonary: No dyspnea. No wheeze. No cough.  CV: No chest pain. No DEAN. No palpitations. No orthopnea. No PND. No edema.  GI: No BRBPR. No melena. No nausea.  : No hematuria.  Neuro: No weakness. No paresthesia. No syncope.    T(C): --  HR: --  BP: --  RR: --  SpO2: --  Physical Exam:   General: Awake, alert, speech clear, no acute distress.  Neck: No bruit, no JVD.  Chest: S1, S2. No murmur. CTA.  Abdomen: Soft. Nondistended.  Extremities: No edema. Pulses: DP: Right: 2+, Left: 2+, Radial: Right: 2+, Left: 2+ 79 y/o female never smoker with history of HTN, HLD, DM2, hypothyroid, and coronary artery calcifications seen on prior cardiac PET scan. She was seen Dr. Velazquez c/o mild exertional dyspnea, fatigue, and left leg pain. Patient underwent coronary CTA, revealing possible high grade stenosis of LAD with a significant calcium burden, patient had cardiac PET scan in 5/23 which was negative for ischemia. Patient underwent lower extremity PVR, which was inconclusive due to non compressibility, further vascular evaluation pending.    Symptoms:        Angina (Class): N/A       Ischemic Symptoms: DEAN (CCS class 3 anginal equivalent)    Heart Failure: N/A    Assessment of LVEF:       EF: 60%       Assessed by: Coronary CTA       Date: 11/3/2023    Prior Cardiac Interventions:       PCI's: N/A       CABG: N/A    LHC: 6/3/2019  CORONARY VESSELS: The coronary circulation is right dominant.  LM:     --  LM: Normal.  LAD:     --  Proximal LAD: There was a 10 % stenosis.  --  D1: There was a 40 % stenosis.  CX:     --  Distal circumflex: There was a 20 % stenosis.  RCA:     --  RCA: Angiography showed mild atherosclerosis with no flow limiting lesions.    Noninvasive Testing:   Cardiac CTA Calcium Score: 11/3/2023       Coronary Circulation: Right dominant        LM: There is a mixture of calcified and noncalcified plaque in the LM with no discrete luminal stenosis > 20%. (49)       LAD: There is a mixture of calcified and noncalcified plaque in the pLAD, mLAD, and D1. There is a suspected luminal stenosis > 70% within 1 cm of the mLAD, approximately 2 cm from it's origin. There is a suspected luminal stenosis > 70% in the proximal 1 cm of the D1. (518)       LCX: There is a mixture of calcified and noncalcified plaque in the pLCX and mLCX with no discrete luminal stenosis > 50%. (33)       RCA: There is a mixture of calcified and noncalcified plaque in the pRCA, mRCA, dRCA, and PLV branch with no discrete luminal stenosis > 50%. (916)       Total: 1516    Stress Test: 5/8/2023       Protocol: Cardiac PET Scan       Symptoms: None       EKG Changes: Negative       Myocardial Imaging: Now evidence of infarct or ischemia. Coronary calcification in the LAD and RCA region.       Risk Assessment: Low    Echo: 4/16/2019  1. Left Ventricle: Normal LV size and systolic function. Estimated LVEF 55-60%. Evidence of diastolic LV dysfunction.  Cannot exclude regional wall motion abnormalities due to limited endocardial visualization.   2. Right Ventricle: Normal RV size and systolic function.   3. Atria: Moderate LA enlargement.   4. Aortic Valve: Sclerotic aortic valves with adequate opening.   5. Mitral Valve: Mild mitral stenosis. Mild mitral regurgitation.   6. Tricuspid Valve: Trace tricuspid regurgitation.   7. Pulmonic Valve: Grossly normal valvular function.   8. Estimated PA systolic pressure 30-35mmHg, normal.   9. Technically limited study.     Antianginal Therapies:        Beta Blockers: N/A       Calcium Channel Blockers: N/A       Long Acting Nitrates: N/A       Ranexa: N/A    Associated Risk Factors:        Frailty: N/A       Cerebrovascular Disease: N/A       Chronic Lung Disease: N/A       Peripheral Arterial Disease: N/A       Chronic Kidney Disease (if yes, what is GFR): N/A       Uncontrolled Diabetes (if yes, what is HgbA1C or FBS): N/A       Poorly Controlled Hypertension (if yes, what is SBP): N/A       Morbid Obesity (if yes, what is BMI): N/A       History of Recent Ventricular Arrhythmia: N/A       Inability to Ambulate Safely: N/A       Need for Therapeutic Anticoagulation: N/A       Antiplatelet or Contrast Allergy: N/A    Social History:        Marital: , lives alone, daughter nearby.       Tobacco: Never smoker       ETOH: Denies       Caffeine: 1 cup coffee daily    ROS:   General: No fevers/chills. + fatigue  HEENT: No hearing loss. No visual disturbances. No headaches. No epistaxis.  Pulmonary: No dyspnea. No wheeze. + cough.  CV: No chest pain. + DEAN. No palpitations. No orthopnea. No PND. + edema.  GI: No BRBPR. No melena. No nausea.  : No hematuria.  Neuro: No weakness. No paresthesia. No syncope.    T(C): 36.8 (11-15-23 @ 14:00), Max: 36.8 (11-15-23 @ 14:00)  HR: 75 (11-15-23 @ 14:00)  BP: 152/74 (11-15-23 @ 14:00)  RR: 18 (11-15-23 @ 14:00)  SpO2: 93% (11-15-23 @ 14:00)  Physical Exam:   General: Awake, alert, speech clear, no acute distress.  Neck: No bruit, no JVD.  Chest: S1, S2. No murmur. CTA.  Abdomen: Soft. Nondistended.  Extremities: No edema. Pulses: DP: Right: 1+, Left: + by doppler, Radial: Right: 2+, Left: 2+

## 2023-11-14 NOTE — H&P PST ADULT - OTHER CARE PROVIDERS
DR CHLOÉ CLAY (Cardiology) Tavares Velazquez ([facility] - [facility], [address] [place] [id number], [phone], Fax: [phone]))

## 2023-11-14 NOTE — H&P PST ADULT - ASSESSMENT
77 y/o female with PMHx of HTN, HLD, poorly controlled DM, and coronary artery calcifications, was seen at OP cardiology office for follow up, Patient with c/o mild exertional dyspnea, and left leg pain.  Patient underwent coronary CTA , revealing possible high grade stenosis of LAD with a significant calcium burden,  patient had cardiac PET  scan in 5/23 which was negative for ischemia.   Patient now presents to [facility] CCL for LHC to assess coronary artery with possible intervention.    Risk Assessments:  ASA:  Mallampati:  GFR:   Cr:  BRA:    Impression: Strong CVd risk factors of     Plan:    -plan for *** via ***  -patient seen and examined  -confirmed appropriate NPO duration  -ECG and Labs reviewed  -Aspirin 81mg po pre-cath  -procedure discussed with patient; risks and benefits explained, questions answered  -consent obtained by attending IC           79 y/o female with PMHx of HTN, HLD, poorly controlled DM, and coronary artery calcifications, was seen at OP cardiology office for follow up, Patient with c/o mild exertional dyspnea, and left leg pain for which vascular evaluation in progress,.   Patient underwent coronary CTA , revealing possible high grade stenosis of LAD with a significant calcium burden,  patient had cardiac PET  scan in 5/23 which was negative for ischemia.   Patient now presents to [facility] CCL for LHC to assess coronary artery with possible intervention.    Risk Assessments:  ASA: 3  Mallampati:  GFR:   Cr:  BRA:    Impression: Strong CVd risk factors of     Plan:    -plan for LHC with possible intervention  -Preferred ACCESS: RRA/RFA  -patient seen and examined  -confirmed appropriate NPO duration  -ECG and Labs reviewed  -Aspirin 81mg po pre-cath  -procedure discussed with patient; risks and benefits explained, questions answered  -consent to be obtained by attending DR Gil           77 y/o female with PMHx of HTN, HLD, poorly controlled DM, and coronary artery calcifications, was seen at OP cardiology office for follow up, Patient with c/o mild exertional dyspnea, and left leg pain for which vascular evaluation in progress,.   Patient underwent coronary CTA , revealing possible high grade stenosis of LAD with a significant calcium burden,  patient had cardiac PET  scan in 5/23 which was negative for ischemia.   Patient now presents to [facility] CCL for LHC to assess coronary artery with possible intervention.    Risk Assessments:  ASA: 3  Mallampati:  GFR:   Cr:  BRA:    Impression: Strong CVd risk factors of HTN, HLD, Elevated coronary calcium score  Patient requires LHC to evalaute coronary anatomy    Plan:    -plan for LHC with possible intervention  -Preferred ACCESS: RRA/RFA  -patient seen and examined  -confirmed appropriate NPO duration  -ECG and Labs reviewed  -Aspirin 81mg po pre-cath  -procedure discussed with patient; risks and benefits explained, questions answered  -consent to be obtained by attending DR Gil           Assessment:     ASA:   Mall:   ABR:   GFR:   Creatinine:   10 Year ASCVD Risk:     Problem List:   1.   ·	LHC and possible intervention. Consent obtained.  ·	Procedure explained and questions answered.   ·	Will start DAPT if PCI performed.  ·	IV:  mL IV over 1 hour pre and post procedure  ·	Aspirin if not taken today.    2.     Discharge Planning:   Discharge today if no PCI performed.  Discharge in AM if PCI performed. Assessment: 77 y/o female never smoker with history of HTN, HLD, DM2, hypothyroid, and coronary artery calcifications seen on prior cardiac PET scan. She was seen Dr. Velazquez c/o mild exertional dyspnea, fatigue, and left leg pain. Patient underwent coronary CTA, revealing possible high grade stenosis of LAD with a significant calcium burden, patient had cardiac PET scan in 5/23 which was negative for ischemia. Patient underwent lower extremity PVR, which was inconclusive due to non compressibility, further vascular evaluation pending.    ASA: 3  Mall: 2  ABR: 2%  GFR: 87  Creatinine: 0.72    Problem List:   1. Stable angina with moderate risk CTA  ·	LHC and possible intervention. Consent obtained.  ·	Procedure explained and questions answered.   ·	Will start DAPT if PCI performed.  ·	IV:  mL IV over 1 hour pre and post procedure  ·	Aspirin if not taken today.    2. HLD  Goal Non-HDL < 100, LDL < 70  Lipid Panel: ***  Statin:   Other: N/A    3. HTN  ·	BP: 153/74  ·	Beta Blocker: N/A  ·	RAASi: Will continue losartan 25 mg daily  ·	CCB: N/A  ·	Other: N/A    4. DM2  ·	GDMT:   ·	     Diabetic diet.  ·	     FS Q AC and HS with coverage  ·	     HgbA1C: ***  ·	     Basal Insulin: N/A  ·	     Nutritional Insulin: N/A  ·	     Oral Antihyperglycemics: Will continue glipizide 10 mg BID upon discharge. Will restart Janumet  mg BID on November 18, 2023.  ·	     SGLT2i/GLP1-RA: N/A    Discharge Planning:   ·	Discharge today if no PCI performed.  ·	Discharge in AM if PCI performed.

## 2023-11-15 ENCOUNTER — TRANSCRIPTION ENCOUNTER (OUTPATIENT)
Age: 78
End: 2023-11-15

## 2023-11-15 ENCOUNTER — OUTPATIENT (OUTPATIENT)
Dept: OUTPATIENT SERVICES | Facility: HOSPITAL | Age: 78
LOS: 1 days | Discharge: HOME | End: 2023-11-15
Payer: MEDICARE

## 2023-11-15 VITALS
OXYGEN SATURATION: 95 % | RESPIRATION RATE: 17 BRPM | SYSTOLIC BLOOD PRESSURE: 130 MMHG | DIASTOLIC BLOOD PRESSURE: 65 MMHG | HEART RATE: 74 BPM

## 2023-11-15 VITALS
HEART RATE: 75 BPM | WEIGHT: 177.91 LBS | SYSTOLIC BLOOD PRESSURE: 152 MMHG | DIASTOLIC BLOOD PRESSURE: 74 MMHG | OXYGEN SATURATION: 93 % | RESPIRATION RATE: 18 BRPM | HEIGHT: 60 IN | TEMPERATURE: 98 F

## 2023-11-15 DIAGNOSIS — R06.02 SHORTNESS OF BREATH: ICD-10-CM

## 2023-11-15 LAB
A1C WITH ESTIMATED AVERAGE GLUCOSE RESULT: 7.1 % — HIGH (ref 4–5.6)
A1C WITH ESTIMATED AVERAGE GLUCOSE RESULT: 7.1 % — HIGH (ref 4–5.6)
ANION GAP SERPL CALC-SCNC: 12 MMOL/L — SIGNIFICANT CHANGE UP (ref 5–17)
ANION GAP SERPL CALC-SCNC: 12 MMOL/L — SIGNIFICANT CHANGE UP (ref 5–17)
BUN SERPL-MCNC: 18.7 MG/DL — SIGNIFICANT CHANGE UP (ref 8–20)
BUN SERPL-MCNC: 18.7 MG/DL — SIGNIFICANT CHANGE UP (ref 8–20)
CALCIUM SERPL-MCNC: 9.1 MG/DL — SIGNIFICANT CHANGE UP (ref 8.4–10.5)
CALCIUM SERPL-MCNC: 9.1 MG/DL — SIGNIFICANT CHANGE UP (ref 8.4–10.5)
CHLORIDE SERPL-SCNC: 101 MMOL/L — SIGNIFICANT CHANGE UP (ref 96–108)
CHLORIDE SERPL-SCNC: 101 MMOL/L — SIGNIFICANT CHANGE UP (ref 96–108)
CHOLEST SERPL-MCNC: 191 MG/DL — SIGNIFICANT CHANGE UP
CHOLEST SERPL-MCNC: 191 MG/DL — SIGNIFICANT CHANGE UP
CO2 SERPL-SCNC: 24 MMOL/L — SIGNIFICANT CHANGE UP (ref 22–29)
CO2 SERPL-SCNC: 24 MMOL/L — SIGNIFICANT CHANGE UP (ref 22–29)
CREAT SERPL-MCNC: 0.72 MG/DL — SIGNIFICANT CHANGE UP (ref 0.5–1.3)
CREAT SERPL-MCNC: 0.72 MG/DL — SIGNIFICANT CHANGE UP (ref 0.5–1.3)
EGFR: 87 ML/MIN/1.73M2 — SIGNIFICANT CHANGE UP
EGFR: 87 ML/MIN/1.73M2 — SIGNIFICANT CHANGE UP
ESTIMATED AVERAGE GLUCOSE: 157 MG/DL — HIGH (ref 68–114)
ESTIMATED AVERAGE GLUCOSE: 157 MG/DL — HIGH (ref 68–114)
GLUCOSE SERPL-MCNC: 145 MG/DL — HIGH (ref 70–99)
GLUCOSE SERPL-MCNC: 145 MG/DL — HIGH (ref 70–99)
HCT VFR BLD CALC: 35.5 % — SIGNIFICANT CHANGE UP (ref 34.5–45)
HCT VFR BLD CALC: 35.5 % — SIGNIFICANT CHANGE UP (ref 34.5–45)
HDLC SERPL-MCNC: 38 MG/DL — LOW
HDLC SERPL-MCNC: 38 MG/DL — LOW
HGB BLD-MCNC: 12 G/DL — SIGNIFICANT CHANGE UP (ref 11.5–15.5)
HGB BLD-MCNC: 12 G/DL — SIGNIFICANT CHANGE UP (ref 11.5–15.5)
LIPID PNL WITH DIRECT LDL SERPL: 131 MG/DL — HIGH
LIPID PNL WITH DIRECT LDL SERPL: 131 MG/DL — HIGH
MAGNESIUM SERPL-MCNC: 1.7 MG/DL — SIGNIFICANT CHANGE UP (ref 1.6–2.6)
MAGNESIUM SERPL-MCNC: 1.7 MG/DL — SIGNIFICANT CHANGE UP (ref 1.6–2.6)
MCHC RBC-ENTMCNC: 29.8 PG — SIGNIFICANT CHANGE UP (ref 27–34)
MCHC RBC-ENTMCNC: 29.8 PG — SIGNIFICANT CHANGE UP (ref 27–34)
MCHC RBC-ENTMCNC: 33.8 GM/DL — SIGNIFICANT CHANGE UP (ref 32–36)
MCHC RBC-ENTMCNC: 33.8 GM/DL — SIGNIFICANT CHANGE UP (ref 32–36)
MCV RBC AUTO: 88.1 FL — SIGNIFICANT CHANGE UP (ref 80–100)
MCV RBC AUTO: 88.1 FL — SIGNIFICANT CHANGE UP (ref 80–100)
NON HDL CHOLESTEROL: 153 MG/DL — HIGH
NON HDL CHOLESTEROL: 153 MG/DL — HIGH
PLATELET # BLD AUTO: 217 K/UL — SIGNIFICANT CHANGE UP (ref 150–400)
PLATELET # BLD AUTO: 217 K/UL — SIGNIFICANT CHANGE UP (ref 150–400)
POTASSIUM SERPL-MCNC: 4.3 MMOL/L — SIGNIFICANT CHANGE UP (ref 3.5–5.3)
POTASSIUM SERPL-MCNC: 4.3 MMOL/L — SIGNIFICANT CHANGE UP (ref 3.5–5.3)
POTASSIUM SERPL-SCNC: 4.3 MMOL/L — SIGNIFICANT CHANGE UP (ref 3.5–5.3)
POTASSIUM SERPL-SCNC: 4.3 MMOL/L — SIGNIFICANT CHANGE UP (ref 3.5–5.3)
RBC # BLD: 4.03 M/UL — SIGNIFICANT CHANGE UP (ref 3.8–5.2)
RBC # BLD: 4.03 M/UL — SIGNIFICANT CHANGE UP (ref 3.8–5.2)
RBC # FLD: 13.2 % — SIGNIFICANT CHANGE UP (ref 10.3–14.5)
RBC # FLD: 13.2 % — SIGNIFICANT CHANGE UP (ref 10.3–14.5)
SODIUM SERPL-SCNC: 136 MMOL/L — SIGNIFICANT CHANGE UP (ref 135–145)
SODIUM SERPL-SCNC: 136 MMOL/L — SIGNIFICANT CHANGE UP (ref 135–145)
TRIGL SERPL-MCNC: 112 MG/DL — SIGNIFICANT CHANGE UP
TRIGL SERPL-MCNC: 112 MG/DL — SIGNIFICANT CHANGE UP
WBC # BLD: 5.26 K/UL — SIGNIFICANT CHANGE UP (ref 3.8–10.5)
WBC # BLD: 5.26 K/UL — SIGNIFICANT CHANGE UP (ref 3.8–10.5)
WBC # FLD AUTO: 5.26 K/UL — SIGNIFICANT CHANGE UP (ref 3.8–10.5)
WBC # FLD AUTO: 5.26 K/UL — SIGNIFICANT CHANGE UP (ref 3.8–10.5)

## 2023-11-15 PROCEDURE — C1769: CPT

## 2023-11-15 PROCEDURE — 93458 L HRT ARTERY/VENTRICLE ANGIO: CPT

## 2023-11-15 PROCEDURE — 83735 ASSAY OF MAGNESIUM: CPT

## 2023-11-15 PROCEDURE — 85027 COMPLETE CBC AUTOMATED: CPT

## 2023-11-15 PROCEDURE — C1894: CPT

## 2023-11-15 PROCEDURE — 80061 LIPID PANEL: CPT

## 2023-11-15 PROCEDURE — 83036 HEMOGLOBIN GLYCOSYLATED A1C: CPT

## 2023-11-15 PROCEDURE — 93010 ELECTROCARDIOGRAM REPORT: CPT

## 2023-11-15 PROCEDURE — C1887: CPT

## 2023-11-15 PROCEDURE — 36415 COLL VENOUS BLD VENIPUNCTURE: CPT

## 2023-11-15 PROCEDURE — 93799 UNLISTED CV SVC/PROCEDURE: CPT

## 2023-11-15 PROCEDURE — 93005 ELECTROCARDIOGRAM TRACING: CPT

## 2023-11-15 PROCEDURE — 80048 BASIC METABOLIC PNL TOTAL CA: CPT

## 2023-11-15 RX ORDER — MAGNESIUM OXIDE 400 MG ORAL TABLET 241.3 MG
1 TABLET ORAL
Refills: 0 | DISCHARGE

## 2023-11-15 RX ORDER — AMITRIPTYLINE HCL 25 MG
1 TABLET ORAL
Qty: 0 | Refills: 0 | DISCHARGE

## 2023-11-15 RX ORDER — ATORVASTATIN CALCIUM 80 MG/1
1 TABLET, FILM COATED ORAL
Qty: 90 | Refills: 0
Start: 2023-11-15 | End: 2024-02-12

## 2023-11-15 RX ORDER — ASPIRIN/CALCIUM CARB/MAGNESIUM 324 MG
81 TABLET ORAL ONCE
Refills: 0 | Status: COMPLETED | OUTPATIENT
Start: 2023-11-15 | End: 2023-11-15

## 2023-11-15 RX ORDER — LEVOTHYROXINE SODIUM 125 MCG
1 TABLET ORAL
Qty: 0 | Refills: 0 | DISCHARGE

## 2023-11-15 RX ORDER — SODIUM CHLORIDE 9 MG/ML
250 INJECTION INTRAMUSCULAR; INTRAVENOUS; SUBCUTANEOUS ONCE
Refills: 0 | Status: COMPLETED | OUTPATIENT
Start: 2023-11-15 | End: 2023-11-15

## 2023-11-15 RX ORDER — LOSARTAN POTASSIUM 100 MG/1
1 TABLET, FILM COATED ORAL
Qty: 90 | Refills: 0
Start: 2023-11-15 | End: 2024-02-12

## 2023-11-15 RX ORDER — ASPIRIN/CALCIUM CARB/MAGNESIUM 324 MG
1 TABLET ORAL
Refills: 0 | DISCHARGE

## 2023-11-15 RX ORDER — LOSARTAN POTASSIUM 100 MG/1
0.5 TABLET, FILM COATED ORAL
Refills: 0 | DISCHARGE

## 2023-11-15 RX ORDER — ATORVASTATIN CALCIUM 80 MG/1
1 TABLET, FILM COATED ORAL
Qty: 0 | Refills: 0 | DISCHARGE

## 2023-11-15 RX ADMIN — Medication 81 MILLIGRAM(S): at 16:49

## 2023-11-15 RX ADMIN — SODIUM CHLORIDE 250 MILLILITER(S): 9 INJECTION INTRAMUSCULAR; INTRAVENOUS; SUBCUTANEOUS at 14:49

## 2023-11-15 NOTE — ASU PATIENT PROFILE, ADULT - FALL HARM RISK - TYPE OF ASSESSMENT
----- Message from Luciana Cornell MA sent at 10/28/2021 11:47 AM CDT -----  Regarding: FW: needs nitroglycerin reordered please    ----- Message -----  From: Gifty Arrington RN  Sent: 10/28/2021  11:38 AM CDT  To: Gifty Arrington RN, #  Subject: needs nitroglycerin reordered please             Dr. Morris,     Would you please be able to have one of your nurses reorder the nitro for patient since he has an older bottle and had the stimulator placed today and would like to have some on board just in case. I appreciated your help in this matter. Thank you.    Gifty Arrington, MSN, BSN, RN  RN Care Manager-[facility]  [phone]       Admission

## 2023-11-15 NOTE — DISCHARGE NOTE PROVIDER - NSDCCPCAREPLAN_GEN_ALL_CORE_FT
PRINCIPAL DISCHARGE DIAGNOSIS  Diagnosis: Coronary artery disease involving native coronary artery of native heart without angina pectoris  Assessment and Plan of Treatment:   Wrist precautions explained.  GDMT:        APT: Will continue aspirin 81 mg daily       Statin: Will increase Lipitor to 40 mg daily       Beta Blocker: N/A       Other Antianginals: N/A       Aggressive cardiovascular risk reduction and lifestyle modification.      SECONDARY DISCHARGE DIAGNOSES  Diagnosis: Pure hypercholesterolemia  Assessment and Plan of Treatment:   Goal Non-HDL < 100, LDL < 70  Lipid Panel: Poorly controlled  Statin: Will increase Lipitor to 40 mg daily  Other: N/A    Diagnosis: Primary hypertension  Assessment and Plan of Treatment:   BP Range: 140-157/70-80  Beta Blocker: N/A  RAASi:  Will increase losartan to 50 mg daily  CCB: N/A  Other: N/A  Will go to [place] Friday for BP check. Office will call daughter to set up.    Diagnosis: Type 2 diabetes mellitus with other circulatory complication, without long-term current use of insul  Assessment and Plan of Treatment:   GDMT:        Diabetic diet.       FS Q AC and HS with coverage       HgbA1C: 7.1%       Basal Insulin: N/A       Nutritional Insulin: N/A       Oral Antihyperglycemics: Will continue glipizide 10 mg BID upon discharge. Will restart Janumet  mg BID on November 18, 2023.       SGLT2i/GLP1-RA: N/A

## 2023-11-15 NOTE — DISCHARGE NOTE PROVIDER - CARE PROVIDER_API CALL
Tavares Velazquez [facility]  Cardiology  [address] [place] [id number]  Phone: [phone]  Fax: [phone]  Established Patient  Follow Up Time: 2 weeks

## 2023-11-15 NOTE — DISCHARGE NOTE NURSING/CASE MANAGEMENT/SOCIAL WORK - PATIENT PORTAL LINK FT
You can access the FollowMyHealth Patient Portal offered by [facility] by registering at the following website: [email or web address]. By joining [employer]’s FollowMyHealth portal, you will also be able to view your health information using other applications (apps) compatible with our system.

## 2023-11-15 NOTE — PROGRESS NOTE ADULT - SUBJECTIVE AND OBJECTIVE BOX
Department of Cardiology                                                                  [facility]/[place] [address]  [place]                                                            Telephone: [phone]. Fax:[phone]                                                        INTERVENTIONAL CARDIOLOGY CATHETERIZATION NOTE     Subjective:  78y  Female who had a left heart catheterization which showed:  Coronary Angiography   ·	The coronary circulation is right dominant.    LM   ·	Left main artery: Angiography shows no disease.    LAD   ·	Left anterior descending artery: Angiography shows mild atherosclerosis. iFR 0.93. There is a 30 % stenosis.  CX   ·	Circumflex: Angiography shows minor irregularities.    RCA   ·	Right coronary artery: Angiography shows minor irregularities.    Left Heart Cath   ·	Ejection fraction was visually estimated by LV Gram with a value of 65%.   ·	The left ventricular end diastolic pressure was 12 mmHg.   Valves   ·	Mitral Valve: The mitral valve exhibits moderate regurgitation.          Access: Right radial artery       Hemostasis: Radial band       Total Contrast: 114 mL Omnipaque       Total Heparin: 5,000 units       Antiplatelet Given: N/A    PAST MEDICAL & SURGICAL HISTORY:  Hypothyroid  Type 2 diabetes mellitus with other circulatory complication, without long-term current use of insul  Primary hypertension  PVD (peripheral vascular disease)  Hyperlipidemia, unspecified hyperlipidemia type    Home Medications:  aspirin 81 mg oral tablet, chewable: 1 tab(s) chewed once a day (at bedtime) (15 Nov 2023 13:55)  glipiZIDE 10 mg oral tablet: 1 tab(s) orally 2 times a day (15 Nov 2023 13:55)  Janumet 50 mg-1000 mg oral tablet: 1 tab(s) orally 2 times a day (15 Nov 2023 13:55)  Lipitor 20 mg oral tablet: 1 tab(s) orally once a day (15 Nov 2023 13:55)  losartan 50 mg oral tablet: 0.5 tab(s) orally once a day (15 Nov 2023 13:52)  magnesium oxide 500 mg oral tablet: 1 tab(s) orally once a day (15 Nov 2023 13:57)  Synthroid 112 mcg (0.112 mg) oral tablet: 1 tab(s) orally once a day (15 Nov 2023 13:55)    HPI: 79 y/o female never smoker with history of HTN, HLD, DM2, hypothyroid, and coronary artery calcifications seen on prior cardiac PET scan. She was seen Dr. Velazquez c/o mild exertional dyspnea, fatigue, and left leg pain. Patient underwent coronary CTA, revealing possible high grade stenosis of LAD with a significant calcium burden, patient had cardiac PET scan in 5/23 which was negative for ischemia. Patient underwent lower extremity PVR, which was inconclusive due to non compressibility, further vascular evaluation pending.    General: No fatigue, no fevers/chills  Respiratory: No dyspnea, no cough, no wheeze  CV: No chest pain, no palpitations  Abd: No nausea  Neuro: No headache, no dizziness    No Known Allergies    Objective:  Vital Signs Last 24 Hrs  T(C): 36.8 (15 Nov 2023 14:00), Max: 36.8 (15 Nov 2023 14:00)  T(F): 98.2 (15 Nov 2023 14:00), Max: 98.2 (15 Nov 2023 14:00)  HR: 73 (15 Nov 2023 16:10) (73 - 75)  BP: 157/80 (15 Nov 2023 16:00) (152/74 - 157/80)  RR: 16 (15 Nov 2023 16:10) (16 - 18)  SpO2: 96% (15 Nov 2023 16:10) (93% - 97%)    Parameters below as of 15 Nov 2023 16:10  Patient On (Oxygen Delivery Method): room air    CM: SR  Neuro: A&OX3, CN 2-12 intact  HEENT: NC, AT  Lungs: CTA B/L  CV: S1, S2, no murmur, RRR  Abd: Soft  Extremity: Right radial band: no bleeding, fingers warm with good cap refil                          12.0   5.26  )-----------( 217      ( 15 Nov 2023 13:40 )             35.5     11-15    136  |  101  |  18.7  ----------------------------<  145  4.3   |  24.0  |  0.72    Ca    9.1      15 Nov 2023 13:40  Mg     1.7     11-15    Lipids       Total Cholesterol: 191       Triglycerides: 112       HDL: 28       Non-HDL: 153       LDL: 131    HgbA1C: 7.1%

## 2023-11-15 NOTE — DISCHARGE NOTE PROVIDER - NSDCFUSCHEDAPPT_GEN_ALL_CORE_FT
[facility]  VASCULAR [address]  Scheduled Appointment: 02/12/2024    Manvar-Singh, Pallavi  [facility]  VASCULAR [address]  Scheduled Appointment: 02/12/2024

## 2023-11-15 NOTE — DISCHARGE NOTE NURSING/CASE MANAGEMENT/SOCIAL WORK - NSDCPEFALRISK_GEN_ALL_CORE
For information on Fall & Injury Prevention, visit: [email or web address] OR  [email or web address] OR  https://www.cdc.gov/steadi/patient.html

## 2023-11-15 NOTE — DISCHARGE NOTE PROVIDER - HOSPITAL COURSE
77 y/o female never smoker with history of HTN, HLD, DM2, hypothyroid, and coronary artery calcifications seen on prior cardiac PET scan. She was seen Dr. Velazquez c/o mild exertional dyspnea, fatigue, and left leg pain. Patient underwent coronary CTA, revealing possible high grade stenosis of LAD with a significant calcium burden, patient had cardiac PET scan in 5/23 which was negative for ischemia. She had a LHC which showed moderate LAD stenosis with a negative iFR.     General: No fatigue, no fevers/chills  Respiratory: No dyspnea, no cough, no wheeze  CV: No chest pain, no palpitations  Abd: No nausea  Neuro: No headache, no dizziness    No Known Allergies    Objective:  Vital Signs Last 24 Hrs  T(C): 36.8 (15 Nov 2023 14:00), Max: 36.8 (15 Nov 2023 14:00)  T(F): 98.2 (15 Nov 2023 14:00), Max: 98.2 (15 Nov 2023 14:00)  HR: 73 (15 Nov 2023 16:10) (73 - 75)  BP: 157/80 (15 Nov 2023 16:00) (152/74 - 157/80)  RR: 16 (15 Nov 2023 16:10) (16 - 18)  SpO2: 96% (15 Nov 2023 16:10) (93% - 97%)    Parameters below as of 15 Nov 2023 16:10  Patient On (Oxygen Delivery Method): room air    CM: SR  Neuro: A&OX3, CN 2-12 intact  HEENT: NC, AT  Lungs: CTA B/L  CV: S1, S2, no murmur, RRR  Abd: Soft  Extremity: Right radial band: no bleeding, fingers warm with good cap refil

## 2023-11-15 NOTE — PROGRESS NOTE ADULT - ASSESSMENT
78y Female   Procedure: Left heart catheterization    1. S/P LHC: Moderate LAD stenosis  ·	Remove radial band at:   ·	Wrist precautions explained.  ·	GDMT:   ·	     APT: Will continue aspirin 81 mg daily  ·	     Statin: Will increase Lipitor to 40 mg daily  ·	     Beta Blocker:   ·	     Other Antianginals:   ·	     Aggressive cardiovascular risk reduction and lifestyle modification.    2. HLD  ·	Goal Non-HDL < 100, LDL < 70  ·	Lipid Panel: Poorly controlled  ·	Statin: Will increase Lipitor to 40 mg daily  ·	Other: N/A    3. HTN  ·	BP Range Last 24 Hours:   ·	Beta Blocker:   ·	RAASi: Will continue losartan 25 mg daily  ·	CCB:   ·	Other:     4. DM2  ·	GDMT:   ·	     Diabetic diet.  ·	     FS Q AC and HS with coverage  ·	     HgbA1C: 7.1%  ·	     Basal Insulin: N/A  ·	     Nutritional Insulin: N/A  ·	     Oral Antihyperglycemics: Will continue glipizide 10 mg BID upon discharge. Will restart Janumet  mg BID on November 18, 2023.  ·	     SGLT2i/GLP1-RA: N/A    Discharge Planning:   ·	If OK, discharge home at:   ·	Follow up as an outpatient with: Dr. Velazquez     78y Female   Procedure: Left heart catheterization    1. S/P LHC: Moderate LAD stenosis  ·	Remove radial band at: 5:00PM  ·	Wrist precautions explained.  ·	GDMT:   ·	     APT: Will continue aspirin 81 mg daily  ·	     Statin: Will increase Lipitor to 40 mg daily  ·	     Beta Blocker: N/A  ·	     Other Antianginals: N/A  ·	     Aggressive cardiovascular risk reduction and lifestyle modification.    2. HLD  ·	Goal Non-HDL < 100, LDL < 70  ·	Lipid Panel: Poorly controlled  ·	Statin: Will increase Lipitor to 40 mg daily  ·	Other: N/A    3. HTN  ·	BP Range: 140-157/70-80  ·	Beta Blocker: N/A  ·	RAASi:  Will increase losartan to 50 mg daily  ·	CCB: N/A  ·	Other: N/A  ·	Will go to [place] Friday for BP check. Office will call daughter to set up.    4. DM2  ·	GDMT:   ·	     Diabetic diet.  ·	     FS Q AC and HS with coverage  ·	     HgbA1C: 7.1%  ·	     Basal Insulin: N/A  ·	     Nutritional Insulin: N/A  ·	     Oral Antihyperglycemics: Will continue glipizide 10 mg BID upon discharge. Will restart Janumet  mg BID on November 18, 2023.  ·	     SGLT2i/GLP1-RA: N/A    Discharge Planning:   ·	If OK, discharge home at: 6:00 PM  ·	Follow up as an outpatient with: Dr. Velazquez

## 2023-11-15 NOTE — DISCHARGE NOTE PROVIDER - NSDCCPTREATMENT_GEN_ALL_CORE_FT
PRINCIPAL PROCEDURE  Procedure: Left heart catheterization  Findings and Treatment:   Coronary Angiography   The coronary circulation is right dominant.    LM   Left main artery: Angiography shows no disease.    LAD   Left anterior descending artery: Angiography shows mild atherosclerosis. iFR 0.93. There is a 30 % stenosis.  CX   Circumflex: Angiography shows minor irregularities.    RCA   Right coronary artery: Angiography shows minor irregularities.    Left Heart Cath   Ejection fraction was visually estimated by LV Gram with a value of 65%.   The left ventricular end diastolic pressure was 12 mmHg.   Valves   Mitral Valve: The mitral valve exhibits moderate regurgitation.

## 2023-11-16 DIAGNOSIS — I25.10 ATHEROSCLEROTIC HEART DISEASE OF NATIVE CORONARY ARTERY WITHOUT ANGINA PECTORIS: ICD-10-CM

## 2023-11-18 RX ORDER — SITAGLIPTIN AND METFORMIN HYDROCHLORIDE 500; 50 MG/1; MG/1
1 TABLET, FILM COATED ORAL
Qty: 0 | Refills: 0 | DISCHARGE
Start: 2023-11-18

## 2024-02-12 ENCOUNTER — APPOINTMENT (OUTPATIENT)
Dept: VASCULAR SURGERY | Facility: CLINIC | Age: 79
End: 2024-02-12

## 2024-03-03 ENCOUNTER — RX ONLY (RX ONLY)
Age: 79
End: 2024-03-03

## 2024-03-03 ENCOUNTER — OFFICE (OUTPATIENT)
Dept: URBAN - METROPOLITAN AREA CLINIC 12 | Facility: CLINIC | Age: 79
Setting detail: OPHTHALMOLOGY
End: 2024-03-03
Payer: MEDICARE

## 2024-03-03 DIAGNOSIS — H25.13: ICD-10-CM

## 2024-03-03 DIAGNOSIS — E11.3293: ICD-10-CM

## 2024-03-03 DIAGNOSIS — H40.1131: ICD-10-CM

## 2024-03-03 PROCEDURE — 92250 FUNDUS PHOTOGRAPHY W/I&R: CPT | Performed by: OPHTHALMOLOGY

## 2024-03-03 PROCEDURE — 92004 COMPRE OPH EXAM NEW PT 1/>: CPT | Performed by: OPHTHALMOLOGY

## 2024-03-03 ASSESSMENT — REFRACTION_MANIFEST
OS_SPHERE: +0.25
OS_VA1: 20/50-
OD_SPHERE: -0.50
OS_AXIS: 075
OD_VA1: 20/50+2
OD_AXIS: 060
OD_CYLINDER: -2.25
OS_CYLINDER: -2.00

## 2024-03-03 ASSESSMENT — SPHEQUIV_DERIVED
OD_SPHEQUIV: -1.625
OS_SPHEQUIV: -0.75

## 2024-03-23 ENCOUNTER — OFFICE (OUTPATIENT)
Dept: URBAN - METROPOLITAN AREA CLINIC 12 | Facility: CLINIC | Age: 79
Setting detail: OPHTHALMOLOGY
End: 2024-03-23
Payer: MEDICARE

## 2024-03-23 ENCOUNTER — RX ONLY (RX ONLY)
Age: 79
End: 2024-03-23

## 2024-03-23 DIAGNOSIS — H25.13: ICD-10-CM

## 2024-03-23 DIAGNOSIS — H25.11: ICD-10-CM

## 2024-03-23 DIAGNOSIS — H40.1131: ICD-10-CM

## 2024-03-23 DIAGNOSIS — E11.3293: ICD-10-CM

## 2024-03-23 PROCEDURE — 99213 OFFICE O/P EST LOW 20 MIN: CPT | Performed by: OPHTHALMOLOGY

## 2024-03-23 PROCEDURE — 92136 OPHTHALMIC BIOMETRY: CPT | Mod: TC | Performed by: OPHTHALMOLOGY

## 2024-03-23 PROCEDURE — 92136 OPHTHALMIC BIOMETRY: CPT | Mod: RT | Performed by: OPHTHALMOLOGY

## 2024-03-23 ASSESSMENT — REFRACTION_CURRENTRX
OS_AXIS: 057
OD_CYLINDER: -2.50
OD_ADD: +3.50
OS_SPHERE: +0.50
OS_ADD: +3.50
OS_VPRISM_DIRECTION: BF
OD_SPHERE: -0.50
OD_AXIS: 071
OS_CYLINDER: -2.50
OS_OVR_VA: 20/
OD_OVR_VA: 20/
OD_VPRISM_DIRECTION: PROGS

## 2024-03-23 ASSESSMENT — REFRACTION_MANIFEST
OD_CYLINDER: -2.25
OS_VA1: 20/50-
OS_AXIS: 075
OS_CYLINDER: -2.00
OD_SPHERE: -0.50
OD_VA1: 20/50+2
OS_SPHERE: +0.25
OD_AXIS: 060

## 2024-03-23 ASSESSMENT — SPHEQUIV_DERIVED
OD_SPHEQUIV: -1.625
OS_SPHEQUIV: -0.75

## 2024-04-12 ENCOUNTER — ASC (OUTPATIENT)
Dept: URBAN - METROPOLITAN AREA SURGERY 8 | Facility: SURGERY | Age: 79
Setting detail: OPHTHALMOLOGY
End: 2024-04-12
Payer: MEDICARE

## 2024-04-12 DIAGNOSIS — H25.11: ICD-10-CM

## 2024-04-12 PROCEDURE — 66984 XCAPSL CTRC RMVL W/O ECP: CPT | Mod: RT | Performed by: OPHTHALMOLOGY

## 2024-04-13 ENCOUNTER — RX ONLY (RX ONLY)
Age: 79
End: 2024-04-13

## 2024-04-13 ENCOUNTER — OFFICE (OUTPATIENT)
Dept: URBAN - METROPOLITAN AREA CLINIC 12 | Facility: CLINIC | Age: 79
Setting detail: OPHTHALMOLOGY
End: 2024-04-13
Payer: MEDICARE

## 2024-04-13 DIAGNOSIS — H25.12: ICD-10-CM

## 2024-04-13 PROCEDURE — 92136 OPHTHALMIC BIOMETRY: CPT | Mod: LT | Performed by: OPHTHALMOLOGY

## 2024-04-23 ENCOUNTER — ASC (OUTPATIENT)
Dept: URBAN - METROPOLITAN AREA SURGERY 8 | Facility: SURGERY | Age: 79
Setting detail: OPHTHALMOLOGY
End: 2024-04-23
Payer: MEDICARE

## 2024-04-23 DIAGNOSIS — H25.12: ICD-10-CM

## 2024-04-23 PROCEDURE — 66984 XCAPSL CTRC RMVL W/O ECP: CPT | Mod: 79,LT | Performed by: OPHTHALMOLOGY

## 2024-04-24 ENCOUNTER — OFFICE (OUTPATIENT)
Dept: URBAN - METROPOLITAN AREA CLINIC 12 | Facility: CLINIC | Age: 79
Setting detail: OPHTHALMOLOGY
End: 2024-04-24
Payer: MEDICARE

## 2024-04-24 DIAGNOSIS — Z96.1: ICD-10-CM

## 2024-04-24 PROCEDURE — 99024 POSTOP FOLLOW-UP VISIT: CPT | Performed by: OPTOMETRIST

## 2024-05-05 ENCOUNTER — OFFICE (OUTPATIENT)
Dept: URBAN - METROPOLITAN AREA CLINIC 12 | Facility: CLINIC | Age: 79
Setting detail: OPHTHALMOLOGY
End: 2024-05-05
Payer: MEDICARE

## 2024-05-05 DIAGNOSIS — Z96.1: ICD-10-CM

## 2024-05-05 PROCEDURE — 99024 POSTOP FOLLOW-UP VISIT: CPT | Performed by: OPHTHALMOLOGY

## 2024-05-05 ASSESSMENT — CONFRONTATIONAL VISUAL FIELD TEST (CVF)
OD_FINDINGS: FULL
OS_FINDINGS: FULL

## 2024-06-15 ENCOUNTER — OFFICE (OUTPATIENT)
Dept: URBAN - METROPOLITAN AREA CLINIC 12 | Facility: CLINIC | Age: 79
Setting detail: OPHTHALMOLOGY
End: 2024-06-15
Payer: MEDICARE

## 2024-06-15 DIAGNOSIS — Z96.1: ICD-10-CM

## 2024-06-15 PROCEDURE — 99024 POSTOP FOLLOW-UP VISIT: CPT | Performed by: OPTOMETRIST

## 2024-06-15 ASSESSMENT — CONFRONTATIONAL VISUAL FIELD TEST (CVF)
OD_FINDINGS: FULL
OS_FINDINGS: FULL

## 2024-07-06 ENCOUNTER — OFFICE (OUTPATIENT)
Dept: URBAN - METROPOLITAN AREA CLINIC 102 | Facility: CLINIC | Age: 79
Setting detail: OPHTHALMOLOGY
End: 2024-07-06
Payer: MEDICARE

## 2024-07-06 DIAGNOSIS — H40.1121: ICD-10-CM

## 2024-07-06 PROCEDURE — 92020 GONIOSCOPY: CPT | Performed by: STUDENT IN AN ORGANIZED HEALTH CARE EDUCATION/TRAINING PROGRAM

## 2024-07-06 PROCEDURE — 92133 CPTRZD OPH DX IMG PST SGM ON: CPT | Performed by: STUDENT IN AN ORGANIZED HEALTH CARE EDUCATION/TRAINING PROGRAM

## 2024-07-06 PROCEDURE — 99024 POSTOP FOLLOW-UP VISIT: CPT | Performed by: STUDENT IN AN ORGANIZED HEALTH CARE EDUCATION/TRAINING PROGRAM

## 2024-07-06 PROCEDURE — 92083 EXTENDED VISUAL FIELD XM: CPT | Performed by: STUDENT IN AN ORGANIZED HEALTH CARE EDUCATION/TRAINING PROGRAM

## 2024-09-07 ENCOUNTER — OFFICE (OUTPATIENT)
Dept: URBAN - METROPOLITAN AREA CLINIC 102 | Facility: CLINIC | Age: 79
Setting detail: OPHTHALMOLOGY
End: 2024-09-07
Payer: MEDICARE

## 2024-09-07 DIAGNOSIS — Z96.1: ICD-10-CM

## 2024-09-07 DIAGNOSIS — E11.3293: ICD-10-CM

## 2024-09-07 DIAGNOSIS — H40.1121: ICD-10-CM

## 2024-09-07 PROCEDURE — 92014 COMPRE OPH EXAM EST PT 1/>: CPT | Performed by: STUDENT IN AN ORGANIZED HEALTH CARE EDUCATION/TRAINING PROGRAM

## 2024-09-07 PROCEDURE — 92250 FUNDUS PHOTOGRAPHY W/I&R: CPT | Performed by: STUDENT IN AN ORGANIZED HEALTH CARE EDUCATION/TRAINING PROGRAM

## 2024-09-07 ASSESSMENT — CONFRONTATIONAL VISUAL FIELD TEST (CVF)
OD_FINDINGS: FULL
OS_FINDINGS: FULL

## 2025-01-19 ENCOUNTER — OFFICE (OUTPATIENT)
Dept: URBAN - METROPOLITAN AREA CLINIC 12 | Facility: CLINIC | Age: 80
Setting detail: OPHTHALMOLOGY
End: 2025-01-19
Payer: MEDICARE

## 2025-01-19 DIAGNOSIS — H40.1121: ICD-10-CM

## 2025-01-19 DIAGNOSIS — Z96.1: ICD-10-CM

## 2025-01-19 PROCEDURE — 92083 EXTENDED VISUAL FIELD XM: CPT | Performed by: STUDENT IN AN ORGANIZED HEALTH CARE EDUCATION/TRAINING PROGRAM

## 2025-01-19 PROCEDURE — 92133 CPTRZD OPH DX IMG PST SGM ON: CPT | Performed by: STUDENT IN AN ORGANIZED HEALTH CARE EDUCATION/TRAINING PROGRAM

## 2025-01-19 PROCEDURE — 99213 OFFICE O/P EST LOW 20 MIN: CPT | Performed by: STUDENT IN AN ORGANIZED HEALTH CARE EDUCATION/TRAINING PROGRAM

## 2025-01-19 ASSESSMENT — VISUAL ACUITY
OD_BCVA: 20/40+2
OS_BCVA: 20/30-2

## 2025-01-19 ASSESSMENT — REFRACTION_AUTOREFRACTION
OD_SPHERE: +0.50
OD_AXIS: 091
OD_CYLINDER: -1.50
OS_AXIS: 069
OS_CYLINDER: -1.25

## 2025-01-19 ASSESSMENT — KERATOMETRY
OS_K2POWER_DIOPTERS: 43.50
OS_AXISANGLE_DEGREES: 141
OD_K2POWER_DIOPTERS: 43.00
METHOD_AUTO_MANUAL: AUTO
OD_K1POWER_DIOPTERS: 41.50
OD_AXISANGLE_DEGREES: 170
OS_K1POWER_DIOPTERS: 42.25

## 2025-01-19 ASSESSMENT — REFRACTION_MANIFEST
OS_AXIS: 075
OS_CYLINDER: -2.00
OS_SPHERE: +0.25
OD_CYLINDER: -2.25
OD_SPHERE: +0.75
OD_VA1: 20/25
OD_VA1: 20/50+2
OS_CYLINDER: -1.25
OS_AXIS: 076
OS_SPHERE: +0.25
OD_AXIS: 060
OD_SPHERE: -0.50
OD_AXIS: 099
OS_VA1: 20/25+
OD_CYLINDER: -1.50
OS_VA1: 20/50-

## 2025-01-19 ASSESSMENT — REFRACTION_CURRENTRX
OS_VPRISM_DIRECTION: BF
OS_SPHERE: +0.50
OD_ADD: +3.50
OD_AXIS: 071
OD_OVR_VA: 20/
OD_VPRISM_DIRECTION: PROGS
OS_ADD: +3.50
OS_CYLINDER: -2.50
OS_OVR_VA: 20/
OS_AXIS: 057
OD_SPHERE: -0.50
OD_CYLINDER: -2.50

## 2025-01-19 ASSESSMENT — CONFRONTATIONAL VISUAL FIELD TEST (CVF)
OD_FINDINGS: FULL
OS_FINDINGS: FULL

## 2025-01-19 ASSESSMENT — TONOMETRY
OS_IOP_MMHG: 18
OD_IOP_MMHG: 18